# Patient Record
Sex: MALE | Race: BLACK OR AFRICAN AMERICAN | Employment: PART TIME | URBAN - METROPOLITAN AREA
[De-identification: names, ages, dates, MRNs, and addresses within clinical notes are randomized per-mention and may not be internally consistent; named-entity substitution may affect disease eponyms.]

---

## 2017-08-28 ENCOUNTER — HOSPITAL ENCOUNTER (EMERGENCY)
Age: 32
Discharge: HOME | End: 2017-08-28
Attending: EMERGENCY MEDICINE
Payer: SELF-PAY

## 2017-08-28 ENCOUNTER — APPOINTMENT (OUTPATIENT)
Dept: GENERAL RADIOLOGY | Age: 32
End: 2017-08-28
Attending: EMERGENCY MEDICINE
Payer: SELF-PAY

## 2017-08-28 VITALS
OXYGEN SATURATION: 100 % | HEIGHT: 68 IN | TEMPERATURE: 98.1 F | SYSTOLIC BLOOD PRESSURE: 124 MMHG | BODY MASS INDEX: 20.46 KG/M2 | DIASTOLIC BLOOD PRESSURE: 89 MMHG | RESPIRATION RATE: 14 BRPM | HEART RATE: 80 BPM | WEIGHT: 135 LBS

## 2017-08-28 DIAGNOSIS — M54.42 ACUTE BILATERAL LOW BACK PAIN WITH BILATERAL SCIATICA: ICD-10-CM

## 2017-08-28 DIAGNOSIS — M54.41 ACUTE BILATERAL LOW BACK PAIN WITH BILATERAL SCIATICA: ICD-10-CM
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
## 2017-08-28 PROCEDURE — 74011250636 HC RX REV CODE- 250/636: Performed by: EMERGENCY MEDICINE

## 2017-08-28 PROCEDURE — 74011250637 HC RX REV CODE- 250/637: Performed by: EMERGENCY MEDICINE

## 2017-08-28 PROCEDURE — 72100 X-RAY EXAM L-S SPINE 2/3 VWS: CPT

## 2017-08-28 PROCEDURE — 96372 THER/PROPH/DIAG INJ SC/IM: CPT

## 2017-08-28 PROCEDURE — 99283 EMERGENCY DEPT VISIT LOW MDM: CPT

## 2017-08-28 RX ORDER — HYDROCODONE BITARTRATE AND ACETAMINOPHEN 5; 325 MG/1; MG/1
1 TABLET ORAL
Status: COMPLETED | OUTPATIENT
Start: 2017-08-28 | End: 2017-08-28

## 2017-08-28 RX ORDER — HYDROCODONE BITARTRATE AND ACETAMINOPHEN 5; 325 MG/1; MG/1
1 TABLET ORAL
Qty: 10 TAB | Refills: 0 | Status: SHIPPED | OUTPATIENT
Start: 2017-08-28

## 2017-08-28 RX ORDER — KETOROLAC TROMETHAMINE 30 MG/ML
15 INJECTION, SOLUTION INTRAMUSCULAR; INTRAVENOUS
Status: DISCONTINUED | OUTPATIENT
Start: 2017-08-28 | End: 2017-08-28

## 2017-08-28 RX ORDER — IBUPROFEN 800 MG/1
800 TABLET ORAL DAILY
COMMUNITY
End: 2017-08-28

## 2017-08-28 RX ORDER — KETOROLAC TROMETHAMINE 30 MG/ML
30 INJECTION, SOLUTION INTRAMUSCULAR; INTRAVENOUS
Status: COMPLETED | OUTPATIENT
Start: 2017-08-28 | End: 2017-08-28

## 2017-08-28 RX ORDER — IBUPROFEN 600 MG/1
600 TABLET ORAL
Qty: 30 TAB | Refills: 0 | Status: SHIPPED | OUTPATIENT
Start: 2017-08-28

## 2017-08-28 RX ADMIN — HYDROCODONE BITARTRATE AND ACETAMINOPHEN 1 TABLET: 5; 325 TABLET ORAL at 08:45

## 2017-08-28 RX ADMIN — KETOROLAC TROMETHAMINE 30 MG: 30 INJECTION, SOLUTION INTRAMUSCULAR at 08:45

## 2017-08-28 NOTE — ED NOTES
Patient stated understanding of discharge instructions. Patient received twp prescription(s) Patient told not to drive with medication. Patient was ambulatory upon discharge. Patient was in stable condition. Patient was accompanies with family member.
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
## 2017-08-28 NOTE — DISCHARGE INSTRUCTIONS
Back Pain: Care Instructions  Your Care Instructions    Back pain has many possible causes. It is often related to problems with muscles and ligaments of the back. It may also be related to problems with the nerves, discs, or bones of the back. Moving, lifting, standing, sitting, or sleeping in an awkward way can strain the back. Sometimes you don't notice the injury until later. Arthritis is another common cause of back pain. Although it may hurt a lot, back pain usually improves on its own within several weeks. Most people recover in 12 weeks or less. Using good home treatment and being careful not to stress your back can help you feel better sooner. Follow-up care is a key part of your treatment and safety. Be sure to make and go to all appointments, and call your doctor if you are having problems. Its also a good idea to know your test results and keep a list of the medicines you take. How can you care for yourself at home? · Sit or lie in positions that are most comfortable and reduce your pain. Try one of these positions when you lie down:  ¨ Lie on your back with your knees bent and supported by large pillows. ¨ Lie on the floor with your legs on the seat of a sofa or chair. [place] on your side with your knees and hips bent and a pillow between your legs. ¨ Lie on your stomach if it does not make pain worse. · Do not sit up in bed, and avoid soft couches and twisted positions. Bed rest can help relieve pain at first, but it delays healing. Avoid bed rest after the first day of back pain. · Change positions every 30 minutes. If you must sit for long periods of time, take breaks from sitting. Get up and walk around, or lie in a comfortable position. · Try using a heating pad on a low or medium setting for 15 to 20 minutes every 2 or 3 hours. Try a warm shower in place of one session with the heating pad. · You can also try an ice pack for 10 to 15 minutes every 2 to 3 hours.  Put a thin cloth between the ice pack and your skin. · Take pain medicines exactly as directed. ¨ If the doctor gave you a prescription medicine for pain, take it as prescribed. ¨ If you are not taking a prescription pain medicine, ask your doctor if you can take an over-the-counter medicine. · Take short walks several times a day. You can start with 5 to 10 minutes, 3 or 4 times a day, and work up to longer walks. Walk on level surfaces and avoid hills and stairs until your back is better. · Return to work and other activities as soon as you can. Continued rest without activity is usually not good for your back. · To prevent future back pain, do exercises to stretch and strengthen your back and stomach. Learn how to use good posture, safe lifting techniques, and proper body mechanics. When should you call for help? Call your doctor now or seek immediate medical care if:  · You have new or worsening numbness in your legs. · You have new or worsening weakness in your legs. (This could make it hard to stand up.)  · You lose control of your bladder or bowels. Watch closely for changes in your health, and be sure to contact your doctor if:  · Your pain gets worse. · You are not getting better after 2 weeks. Where can you learn more? Go to http://ayaan-marquita.info/. Enter H955 in the search box to learn more about \"Back Pain: Care Instructions. \"  Current as of: March 21, 2017  Content Version: 11.3  © 5377-2796 [employer]. Care instructions adapted under license by [employer] (which disclaims liability or warranty for this information). If you have questions about a medical condition or this instruction, always ask your healthcare professional. [address] any warranty or liability for your use of this information.

## 2021-03-05 ENCOUNTER — HISTORICAL (OUTPATIENT)
Dept: ADMINISTRATIVE | Facility: HOSPITAL | Age: 36
End: 2021-03-05

## 2021-03-05 LAB
ALBUMIN SERPL BCP-MCNC: 3.9 G/DL (ref 3.5–5)
ALBUMIN/GLOB SERPL: 0.9 {RATIO}
ALP SERPL-CCNC: 69 U/L (ref 45–115)
ALT SERPL W P-5'-P-CCNC: 44 U/L (ref 16–61)
ANION GAP SERPL CALCULATED.3IONS-SCNC: 15 MMOL/L
AST SERPL W P-5'-P-CCNC: 33 U/L (ref 15–37)
BASOPHILS # BLD AUTO: 0.05 X10E3/UL (ref 0–0.2)
BASOPHILS NFR BLD AUTO: 0.6 % (ref 0–1)
BILIRUB SERPL-MCNC: 0.5 MG/DL (ref 0–1.2)
BUN SERPL-MCNC: 7 MG/DL (ref 7–18)
BUN/CREAT SERPL: 7.2
CALCIUM SERPL-MCNC: 8.9 MG/DL (ref 8.5–10.1)
CHLORIDE SERPL-SCNC: 106 MMOL/L (ref 98–107)
CO2 SERPL-SCNC: 26 MMOL/L (ref 21–32)
CREAT SERPL-MCNC: 0.97 MG/DL (ref 0.7–1.3)
EOSINOPHIL # BLD AUTO: 0.1 X10E3/UL (ref 0–0.5)
EOSINOPHIL NFR BLD AUTO: 1.2 % (ref 1–4)
ERYTHROCYTE [DISTWIDTH] IN BLOOD BY AUTOMATED COUNT: 13.3 % (ref 11.5–14.5)
GLOBULIN SER-MCNC: 4.2 G/DL (ref 2–4)
GLUCOSE SERPL-MCNC: 84 MG/DL (ref 74–106)
HCT VFR BLD AUTO: 46.8 % (ref 40–54)
HGB BLD-MCNC: 15.8 G/DL (ref 13.5–18)
IMM GRANULOCYTES # BLD AUTO: 0.03 X10E3/UL (ref 0–0.04)
IMM GRANULOCYTES NFR BLD: 0.4 % (ref 0–0.4)
LYMPHOCYTES # BLD AUTO: 2.87 X10E3/UL (ref 1–4.8)
LYMPHOCYTES NFR BLD AUTO: 35.7 % (ref 27–41)
MCH RBC QN AUTO: 32.6 PG (ref 27–31)
MCHC RBC AUTO-ENTMCNC: 33.8 G/DL (ref 32–36)
MCV RBC AUTO: 96.5 FL (ref 80–96)
MONOCYTES # BLD AUTO: 0.75 X10E3/UL (ref 0–0.8)
MONOCYTES NFR BLD AUTO: 9.3 % (ref 2–6)
MPC BLD CALC-MCNC: 8.7 FL (ref 9.4–12.4)
NEUTROPHILS # BLD AUTO: 4.23 X10E3/UL (ref 1.8–7.7)
NEUTROPHILS NFR BLD AUTO: 52.8 % (ref 53–65)
NRBC # BLD AUTO: 0 X10E3/UL (ref 0–0)
NRBC, AUTO (.00): 0 /100 (ref 0–0)
PLATELET # BLD AUTO: 355 X10E3/UL (ref 150–400)
POTASSIUM SERPL-SCNC: 4 MMOL/L (ref 3.5–5.1)
PROT SERPL-MCNC: 8.1 G/DL (ref 6.4–8.2)
RBC # BLD AUTO: 4.85 X10E6/UL (ref 4.6–6.2)
SODIUM SERPL-SCNC: 143 MMOL/L (ref 136–145)
WBC # BLD AUTO: 8.03 X10E3/UL (ref 4.5–11)

## 2021-05-30 ENCOUNTER — HOSPITAL ENCOUNTER (EMERGENCY)
Facility: HOSPITAL | Age: 36
Discharge: HOME | End: 2021-05-30

## 2021-05-30 VITALS
HEART RATE: 80 BPM | BODY MASS INDEX: 21.48 KG/M2 | TEMPERATURE: 98 F | SYSTOLIC BLOOD PRESSURE: 141 MMHG | OXYGEN SATURATION: 97 % | RESPIRATION RATE: 20 BRPM | DIASTOLIC BLOOD PRESSURE: 91 MMHG | WEIGHT: 145 LBS | HEIGHT: 69 IN

## 2021-05-30 DIAGNOSIS — K13.79 MOUTH PAIN: Primary | ICD-10-CM

## 2021-05-30 PROCEDURE — 99283 PR EMERGENCY DEPT VISIT,LEVEL III: ICD-10-PCS | Mod: ,,, | Performed by: NURSE PRACTITIONER

## 2021-05-30 PROCEDURE — 99283 EMERGENCY DEPT VISIT LOW MDM: CPT | Mod: ,,, | Performed by: NURSE PRACTITIONER

## 2021-05-30 PROCEDURE — 99283 EMERGENCY DEPT VISIT LOW MDM: CPT

## 2021-05-30 RX ORDER — AMOXICILLIN 875 MG/1
875 TABLET, FILM COATED ORAL 2 TIMES DAILY
Qty: 14 TABLET | Refills: 0 | Status: SHIPPED | OUTPATIENT
Start: 2021-05-30 | End: 2021-06-09

## 2021-05-30 RX ORDER — AMOXICILLIN 875 MG/1
875 TABLET, FILM COATED ORAL 2 TIMES DAILY
Qty: 14 TABLET | Refills: 0 | Status: SHIPPED | OUTPATIENT
Start: 2021-05-30 | End: 2021-05-30 | Stop reason: SDUPTHER

## 2021-09-23 ENCOUNTER — LAB VISIT (OUTPATIENT)
Dept: PRIMARY CARE CLINIC | Facility: CLINIC | Age: 36
End: 2021-09-23

## 2021-09-23 DIAGNOSIS — Z02.83 ENCOUNTER FOR EMPLOYMENT-RELATED DRUG TESTING: ICD-10-CM

## 2021-09-23 PROCEDURE — 99000 PR SPECIMEN HANDLING,DR OFF->LAB: ICD-10-PCS | Mod: ,,, | Performed by: NURSE PRACTITIONER

## 2021-09-23 PROCEDURE — 99000 SPECIMEN HANDLING OFFICE-LAB: CPT | Mod: ,,, | Performed by: NURSE PRACTITIONER

## 2023-04-15 ENCOUNTER — HOSPITAL ENCOUNTER (INPATIENT)
Facility: HOSPITAL | Age: 38
LOS: 2 days | Discharge: HOME | DRG: 247 | End: 2023-04-17
Attending: EMERGENCY MEDICINE | Admitting: INTERNAL MEDICINE

## 2023-04-15 DIAGNOSIS — I49.9 ARRHYTHMIA: ICD-10-CM

## 2023-04-15 DIAGNOSIS — I21.02 ST ELEVATION MYOCARDIAL INFARCTION INVOLVING LEFT ANTERIOR DESCENDING (LAD) CORONARY ARTERY: Primary | ICD-10-CM

## 2023-04-15 DIAGNOSIS — I47.20 VENTRICULAR TACHYCARDIA: ICD-10-CM

## 2023-04-15 DIAGNOSIS — F17.200 SMOKER: ICD-10-CM

## 2023-04-15 DIAGNOSIS — F10.10 ETOH ABUSE: ICD-10-CM

## 2023-04-15 DIAGNOSIS — R07.9 CHEST PAIN: ICD-10-CM

## 2023-04-15 DIAGNOSIS — I21.3 ST ELEVATION MYOCARDIAL INFARCTION (STEMI), UNSPECIFIED ARTERY: ICD-10-CM

## 2023-04-15 DIAGNOSIS — F19.10 SUBSTANCE ABUSE: ICD-10-CM

## 2023-04-15 DIAGNOSIS — I21.3 STEMI (ST ELEVATION MYOCARDIAL INFARCTION): ICD-10-CM

## 2023-04-15 LAB
ALBUMIN SERPL BCP-MCNC: 2.9 G/DL (ref 3.5–5)
ALBUMIN/GLOB SERPL: 0.9 {RATIO}
ALP SERPL-CCNC: 55 U/L (ref 45–115)
ALT SERPL W P-5'-P-CCNC: 47 U/L (ref 16–61)
AMPHET UR QL SCN: NEGATIVE
ANION GAP SERPL CALCULATED.3IONS-SCNC: 5 MMOL/L (ref 7–16)
AST SERPL W P-5'-P-CCNC: 42 U/L (ref 15–37)
BARBITURATES UR QL SCN: NEGATIVE
BASOPHILS # BLD AUTO: 0.11 K/UL (ref 0–0.2)
BASOPHILS NFR BLD AUTO: 0.6 % (ref 0–1)
BASOPHILS NFR BLD MANUAL: 1 % (ref 0–1)
BENZODIAZ METAB UR QL SCN: POSITIVE
BILIRUB SERPL-MCNC: 0.5 MG/DL (ref ?–1.2)
BUN SERPL-MCNC: 7 MG/DL (ref 7–18)
BUN/CREAT SERPL: 7 (ref 6–20)
CALCIUM SERPL-MCNC: 8.4 MG/DL (ref 8.5–10.1)
CANNABINOIDS UR QL SCN: POSITIVE
CATH EF QUANTITATIVE: 45 %
CHLORIDE SERPL-SCNC: 107 MMOL/L (ref 98–107)
CO2 SERPL-SCNC: 30 MMOL/L (ref 21–32)
COCAINE UR QL SCN: POSITIVE
CREAT SERPL-MCNC: 0.94 MG/DL (ref 0.7–1.3)
DIFFERENTIAL METHOD BLD: ABNORMAL
EGFR (NO RACE VARIABLE) (RUSH/TITUS): 108 ML/MIN/1.73M²
EOSINOPHIL # BLD AUTO: 0.14 K/UL (ref 0–0.5)
EOSINOPHIL NFR BLD AUTO: 0.8 % (ref 1–4)
EOSINOPHIL NFR BLD MANUAL: 1 % (ref 1–4)
ERYTHROCYTE [DISTWIDTH] IN BLOOD BY AUTOMATED COUNT: 14.5 % (ref 11.5–14.5)
GLOBULIN SER-MCNC: 3.1 G/DL (ref 2–4)
GLUCOSE SERPL-MCNC: 129 MG/DL (ref 74–106)
GLUCOSE SERPL-MCNC: 145 MG/DL (ref 70–105)
GLUCOSE SERPL-MCNC: 94 MG/DL (ref 70–105)
HCT VFR BLD AUTO: 48.3 % (ref 40–54)
HGB BLD-MCNC: 15.6 G/DL (ref 13.5–18)
IMM GRANULOCYTES # BLD AUTO: 0.09 K/UL (ref 0–0.04)
IMM GRANULOCYTES NFR BLD: 0.5 % (ref 0–0.4)
INR BLD: 1.01
LYMPHOCYTES # BLD AUTO: 4.36 K/UL (ref 1–4.8)
LYMPHOCYTES NFR BLD AUTO: 25.7 % (ref 27–41)
LYMPHOCYTES NFR BLD MANUAL: 25 % (ref 27–41)
MCH RBC QN AUTO: 29.3 PG (ref 27–31)
MCHC RBC AUTO-ENTMCNC: 32.3 G/DL (ref 32–36)
MCV RBC AUTO: 90.8 FL (ref 80–96)
MONOCYTES # BLD AUTO: 1.21 K/UL (ref 0–0.8)
MONOCYTES NFR BLD AUTO: 7.1 % (ref 2–6)
MONOCYTES NFR BLD MANUAL: 5 % (ref 2–6)
MPC BLD CALC-MCNC: 8.7 FL (ref 9.4–12.4)
NEUTROPHILS # BLD AUTO: 11.07 K/UL (ref 1.8–7.7)
NEUTROPHILS NFR BLD AUTO: 65.3 % (ref 53–65)
NEUTS BAND NFR BLD MANUAL: 2 % (ref 1–5)
NEUTS SEG NFR BLD MANUAL: 66 % (ref 50–62)
NRBC # BLD AUTO: 0 X10E3/UL
NRBC, AUTO (.00): 0 %
OPIATES UR QL SCN: POSITIVE
PCP UR QL SCN: NEGATIVE
PLATELET # BLD AUTO: 384 K/UL (ref 150–400)
PLATELET MORPHOLOGY: NORMAL
POTASSIUM SERPL-SCNC: 4.3 MMOL/L (ref 3.5–5.1)
PROT SERPL-MCNC: 6 G/DL (ref 6.4–8.2)
PROTHROMBIN TIME: 12.9 SECONDS (ref 11.7–14.7)
RBC # BLD AUTO: 5.32 M/UL (ref 4.6–6.2)
RBC MORPH BLD: NORMAL
SODIUM SERPL-SCNC: 138 MMOL/L (ref 136–145)
TROPONIN I SERPL HS-MCNC: 30.2 PG/ML
WBC # BLD AUTO: 16.98 K/UL (ref 4.5–11)

## 2023-04-15 PROCEDURE — 99285 EMERGENCY DEPT VISIT HI MDM: CPT | Mod: ,,, | Performed by: EMERGENCY MEDICINE

## 2023-04-15 PROCEDURE — 85610 PROTHROMBIN TIME: CPT | Performed by: EMERGENCY MEDICINE

## 2023-04-15 PROCEDURE — 93458 PR CATH PLACE/CORON ANGIO, IMG SUPER/INTERP,W LEFT HEART VENTRICULOGRAPHY: ICD-10-PCS | Mod: 26,59,51, | Performed by: INTERNAL MEDICINE

## 2023-04-15 PROCEDURE — 27201423 OPTIME MED/SURG SUP & DEVICES STERILE SUPPLY: Performed by: INTERNAL MEDICINE

## 2023-04-15 PROCEDURE — 63600150 PHARM REV CODE 636: Performed by: INTERNAL MEDICINE

## 2023-04-15 PROCEDURE — 96375 TX/PRO/DX INJ NEW DRUG ADDON: CPT

## 2023-04-15 PROCEDURE — 25000003 PHARM REV CODE 250: Performed by: INTERNAL MEDICINE

## 2023-04-15 PROCEDURE — C1894 INTRO/SHEATH, NON-LASER: HCPCS | Performed by: INTERNAL MEDICINE

## 2023-04-15 PROCEDURE — 99152 PR MOD CONSCIOUS SEDATION, SAME PHYS, 5+ YRS, FIRST 15 MIN: ICD-10-PCS | Mod: ,,, | Performed by: INTERNAL MEDICINE

## 2023-04-15 PROCEDURE — 92941 PRQ TRLML REVSC TOT OCCL AMI: CPT | Mod: LD,,, | Performed by: INTERNAL MEDICINE

## 2023-04-15 PROCEDURE — C1760 CLOSURE DEV, VASC: HCPCS | Performed by: INTERNAL MEDICINE

## 2023-04-15 PROCEDURE — 99285 PR EMERGENCY DEPT VISIT,LEVEL V: ICD-10-PCS | Mod: ,,, | Performed by: EMERGENCY MEDICINE

## 2023-04-15 PROCEDURE — 82962 GLUCOSE BLOOD TEST: CPT

## 2023-04-15 PROCEDURE — 20000000 HC ICU ROOM

## 2023-04-15 PROCEDURE — 93010 ELECTROCARDIOGRAM REPORT: CPT | Mod: 59,,, | Performed by: INTERNAL MEDICINE

## 2023-04-15 PROCEDURE — C9606 PERC D-E COR REVASC W AMI S: HCPCS | Mod: LD | Performed by: INTERNAL MEDICINE

## 2023-04-15 PROCEDURE — 96374 THER/PROPH/DIAG INJ IV PUSH: CPT

## 2023-04-15 PROCEDURE — 25000003 PHARM REV CODE 250: Performed by: EMERGENCY MEDICINE

## 2023-04-15 PROCEDURE — 99285 EMERGENCY DEPT VISIT HI MDM: CPT | Mod: 25

## 2023-04-15 PROCEDURE — C1874 STENT, COATED/COV W/DEL SYS: HCPCS | Performed by: INTERNAL MEDICINE

## 2023-04-15 PROCEDURE — 93458 L HRT ARTERY/VENTRICLE ANGIO: CPT | Performed by: INTERNAL MEDICINE

## 2023-04-15 PROCEDURE — 25000003 PHARM REV CODE 250: Performed by: NURSE PRACTITIONER

## 2023-04-15 PROCEDURE — 84484 ASSAY OF TROPONIN QUANT: CPT | Performed by: EMERGENCY MEDICINE

## 2023-04-15 PROCEDURE — 63600175 PHARM REV CODE 636 W HCPCS: Performed by: INTERNAL MEDICINE

## 2023-04-15 PROCEDURE — 99152 MOD SED SAME PHYS/QHP 5/>YRS: CPT | Mod: ,,, | Performed by: INTERNAL MEDICINE

## 2023-04-15 PROCEDURE — 93005 ELECTROCARDIOGRAM TRACING: CPT

## 2023-04-15 PROCEDURE — C1769 GUIDE WIRE: HCPCS | Performed by: INTERNAL MEDICINE

## 2023-04-15 PROCEDURE — 93010 EKG 12-LEAD: ICD-10-PCS | Mod: 59,,, | Performed by: INTERNAL MEDICINE

## 2023-04-15 PROCEDURE — 85025 COMPLETE CBC W/AUTO DIFF WBC: CPT | Performed by: EMERGENCY MEDICINE

## 2023-04-15 PROCEDURE — 99152 MOD SED SAME PHYS/QHP 5/>YRS: CPT | Performed by: INTERNAL MEDICINE

## 2023-04-15 PROCEDURE — 80053 COMPREHEN METABOLIC PANEL: CPT | Performed by: EMERGENCY MEDICINE

## 2023-04-15 PROCEDURE — C1887 CATHETER, GUIDING: HCPCS | Performed by: INTERNAL MEDICINE

## 2023-04-15 PROCEDURE — 93458 L HRT ARTERY/VENTRICLE ANGIO: CPT | Mod: 26,59,51, | Performed by: INTERNAL MEDICINE

## 2023-04-15 PROCEDURE — 80307 DRUG TEST PRSMV CHEM ANLYZR: CPT | Performed by: EMERGENCY MEDICINE

## 2023-04-15 PROCEDURE — 25500020 PHARM REV CODE 255: Performed by: INTERNAL MEDICINE

## 2023-04-15 PROCEDURE — 92941 PR AMI ANY METHOD: ICD-10-PCS | Mod: LD,,, | Performed by: INTERNAL MEDICINE

## 2023-04-15 PROCEDURE — 63600175 PHARM REV CODE 636 W HCPCS: Performed by: EMERGENCY MEDICINE

## 2023-04-15 DEVICE — ANGIO-SEAL VIP VASCULAR CLOSURE DEVICE
Type: IMPLANTABLE DEVICE | Site: CORONARY | Status: FUNCTIONAL
Brand: ANGIO-SEAL

## 2023-04-15 DEVICE — EVEROLIMUS-ELUTING PLATINUM CHROMIUM CORONARY STENT SYSTEM
Type: IMPLANTABLE DEVICE | Site: CORONARY | Status: FUNCTIONAL
Brand: SYNERGY™ XD

## 2023-04-15 RX ORDER — SERTRALINE HCL 100 MG
100 TABLET ORAL
COMMUNITY
Start: 2022-12-15 | End: 2024-03-12 | Stop reason: CLARIF

## 2023-04-15 RX ORDER — ACETAMINOPHEN 325 MG/1
650 TABLET ORAL EVERY 4 HOURS PRN
Status: DISCONTINUED | OUTPATIENT
Start: 2023-04-15 | End: 2023-04-17 | Stop reason: HOSPADM

## 2023-04-15 RX ORDER — HEPARIN SODIUM 1000 [USP'U]/ML
INJECTION, SOLUTION INTRAVENOUS; SUBCUTANEOUS
Status: DISCONTINUED | OUTPATIENT
Start: 2023-04-15 | End: 2023-04-15 | Stop reason: HOSPADM

## 2023-04-15 RX ORDER — FENTANYL CITRATE 50 UG/ML
INJECTION, SOLUTION INTRAMUSCULAR; INTRAVENOUS
Status: DISCONTINUED | OUTPATIENT
Start: 2023-04-15 | End: 2023-04-15 | Stop reason: HOSPADM

## 2023-04-15 RX ORDER — ARIPIPRAZOLE 5 MG/1
5 TABLET ORAL
COMMUNITY
Start: 2022-12-15 | End: 2024-03-12 | Stop reason: CLARIF

## 2023-04-15 RX ORDER — MORPHINE SULFATE 2 MG/ML
2 INJECTION, SOLUTION INTRAMUSCULAR; INTRAVENOUS EVERY 4 HOURS PRN
Status: DISCONTINUED | OUTPATIENT
Start: 2023-04-15 | End: 2023-04-17 | Stop reason: HOSPADM

## 2023-04-15 RX ORDER — LISINOPRIL 5 MG/1
5 TABLET ORAL DAILY
Qty: 90 TABLET | Refills: 3 | Status: SHIPPED | OUTPATIENT
Start: 2023-04-15 | End: 2023-04-17 | Stop reason: HOSPADM

## 2023-04-15 RX ORDER — NAPROXEN SODIUM 220 MG/1
81 TABLET, FILM COATED ORAL DAILY
Status: DISCONTINUED | OUTPATIENT
Start: 2023-04-16 | End: 2023-04-17 | Stop reason: HOSPADM

## 2023-04-15 RX ORDER — LIDOCAINE HYDROCHLORIDE 10 MG/ML
INJECTION INFILTRATION; PERINEURAL
Status: DISCONTINUED | OUTPATIENT
Start: 2023-04-15 | End: 2023-04-16

## 2023-04-15 RX ORDER — NAPROXEN SODIUM 220 MG/1
TABLET, FILM COATED ORAL
Status: COMPLETED
Start: 2023-04-15 | End: 2023-04-15

## 2023-04-15 RX ORDER — SODIUM CHLORIDE 450 MG/100ML
125 INJECTION, SOLUTION INTRAVENOUS CONTINUOUS
Status: ACTIVE | OUTPATIENT
Start: 2023-04-15 | End: 2023-04-15

## 2023-04-15 RX ORDER — ROSUVASTATIN CALCIUM 40 MG/1
40 TABLET, COATED ORAL NIGHTLY
Qty: 90 TABLET | Refills: 3 | Status: SHIPPED | OUTPATIENT
Start: 2023-04-15 | End: 2023-04-17 | Stop reason: HOSPADM

## 2023-04-15 RX ORDER — MORPHINE SULFATE 4 MG/ML
4 INJECTION, SOLUTION INTRAMUSCULAR; INTRAVENOUS
Status: COMPLETED | OUTPATIENT
Start: 2023-04-15 | End: 2023-04-15

## 2023-04-15 RX ORDER — MUPIROCIN 20 MG/G
OINTMENT TOPICAL 2 TIMES DAILY
Status: DISCONTINUED | OUTPATIENT
Start: 2023-04-15 | End: 2023-04-17 | Stop reason: HOSPADM

## 2023-04-15 RX ORDER — MIDAZOLAM HYDROCHLORIDE 1 MG/ML
INJECTION INTRAMUSCULAR; INTRAVENOUS
Status: DISCONTINUED | OUTPATIENT
Start: 2023-04-15 | End: 2023-04-15 | Stop reason: HOSPADM

## 2023-04-15 RX ORDER — ONDANSETRON 4 MG/1
8 TABLET, ORALLY DISINTEGRATING ORAL EVERY 8 HOURS PRN
Status: DISCONTINUED | OUTPATIENT
Start: 2023-04-15 | End: 2023-04-17 | Stop reason: HOSPADM

## 2023-04-15 RX ORDER — ONDANSETRON 2 MG/ML
4 INJECTION INTRAMUSCULAR; INTRAVENOUS
Status: COMPLETED | OUTPATIENT
Start: 2023-04-15 | End: 2023-04-15

## 2023-04-15 RX ORDER — ATORVASTATIN CALCIUM 40 MG/1
40 TABLET, FILM COATED ORAL NIGHTLY
Status: DISCONTINUED | OUTPATIENT
Start: 2023-04-15 | End: 2023-04-17 | Stop reason: HOSPADM

## 2023-04-15 RX ORDER — HEPARIN SODIUM 5000 [USP'U]/ML
INJECTION, SOLUTION INTRAVENOUS; SUBCUTANEOUS
Status: COMPLETED
Start: 2023-04-15 | End: 2023-04-15

## 2023-04-15 RX ORDER — ASPIRIN 325 MG
TABLET ORAL CODE/TRAUMA/SEDATION MEDICATION
Status: DISCONTINUED | OUTPATIENT
Start: 2023-04-15 | End: 2023-04-16

## 2023-04-15 RX ORDER — DOPAMINE HYDROCHLORIDE 320 MG/100ML
INJECTION, SOLUTION INTRAVENOUS
Status: DISCONTINUED | OUTPATIENT
Start: 2023-04-15 | End: 2023-04-16

## 2023-04-15 RX ORDER — CARVEDILOL 3.12 MG/1
3.12 TABLET ORAL 2 TIMES DAILY
Status: DISCONTINUED | OUTPATIENT
Start: 2023-04-15 | End: 2023-04-17

## 2023-04-15 RX ADMIN — MORPHINE SULFATE 4 MG: 4 INJECTION INTRAVENOUS at 11:04

## 2023-04-15 RX ADMIN — TICAGRELOR 90 MG: 90 TABLET ORAL at 10:04

## 2023-04-15 RX ADMIN — ASPIRIN 325 MG ORAL TABLET 324 MG: 325 PILL ORAL at 10:04

## 2023-04-15 RX ADMIN — CARVEDILOL 3.12 MG: 3.12 TABLET, FILM COATED ORAL at 09:04

## 2023-04-15 RX ADMIN — ONDANSETRON HYDROCHLORIDE 4 MG: 2 SOLUTION INTRAMUSCULAR; INTRAVENOUS at 11:04

## 2023-04-15 RX ADMIN — MORPHINE SULFATE 2 MG: 2 INJECTION, SOLUTION INTRAMUSCULAR; INTRAVENOUS at 09:04

## 2023-04-15 RX ADMIN — MUPIROCIN: 20 OINTMENT TOPICAL at 09:04

## 2023-04-15 RX ADMIN — ATORVASTATIN CALCIUM 40 MG: 40 TABLET, FILM COATED ORAL at 09:04

## 2023-04-15 RX ADMIN — ACETAMINOPHEN 650 MG: 325 TABLET ORAL at 05:04

## 2023-04-15 RX ADMIN — SODIUM CHLORIDE 125 ML/HR: 4.5 INJECTION, SOLUTION INTRAVENOUS at 03:04

## 2023-04-15 NOTE — LETTER
April 17, 2023         [address] [place] [id number]  Phone: [phone]  Fax: [phone]       Patient: Compa Paredes   YOB: 1985  Date of Visit: 04/17/2023    To Whom It May Concern:    Michelle Paredes  was at [facility] from 04/15/2023 to 04/17/2023. The patient may return to work/school on April, 24.202. With no restrictions. If you have any questions or concerns, or if I can be of further assistance, please do not hesitate to contact me.    Sincerely,    Anh Espana LPN

## 2023-04-15 NOTE — ED PROVIDER NOTES
Encounter Date: 4/15/2023    SCRIBE #1 NOTE: I, Arlene Rivas, am scribing for, and in the presence of,  John Pelayo MD. I have scribed the entire note.     History     Chief Complaint   Patient presents with    Chest Pain     The patient is a 37 y.o. male who presents to the emergency department for chest pain that began today at 9:30. The patient states that after giving blood this morning, he went to work an began having a headache and chest pain. He also appears diaphoretic. The patient denies diabetes and hypertension and has no history of heart attack. The patient smokes cigarettes and claims that the last time he used cocaine was a month ago. There are no other complaints in the ED at this time.    The history is provided by the patient. No  was used.   Review of patient's allergies indicates:  No Known Allergies  History reviewed. No pertinent past medical history.  History reviewed. No pertinent surgical history.  History reviewed. No pertinent family history.  Social History     Tobacco Use    Smoking status: Every Day     Packs/day: 1.00     Types: Cigarettes    Smokeless tobacco: Never   Substance Use Topics    Alcohol use: Yes    Drug use: Not Currently     Types: Cocaine     Review of Systems   Constitutional:  Positive for diaphoresis.   Eyes: Negative.    Cardiovascular:  Positive for chest pain.   Gastrointestinal:  Negative for abdominal pain.   Endocrine: Negative.    Allergic/Immunologic: Negative.    Neurological:  Positive for headaches.   All other systems reviewed and are negative.    Physical Exam     Initial Vitals   BP Pulse Resp Temp SpO2   04/15/23 1053 04/15/23 1053 04/15/23 1053 04/15/23 1058 04/15/23 1053   (!) 149/112 94 (!) 22 97.1 °F (36.2 °C) 100 %      MAP       --                Physical Exam    Nursing note and vitals reviewed.  Constitutional: He appears well-developed and well-nourished. He is diaphoretic.   HENT:   Head: Normocephalic and  atraumatic.   Eyes: Conjunctivae and EOM are normal. Pupils are equal, round, and reactive to light.   Cardiovascular:  Normal rate, regular rhythm and normal heart sounds.           Pulmonary/Chest: Breath sounds normal.   Abdominal: Abdomen is soft. Bowel sounds are normal.     Neurological: He is alert and oriented to person, place, and time.   Skin: Skin is warm.   Psychiatric: He has a normal mood and affect. His behavior is normal. Judgment and thought content normal.       ED Course   Procedures  Labs Reviewed   DRUG SCREEN, URINE (BEAKER)   EXTRA TUBES    Narrative:     The following orders were created for panel order EXTRA TUBES.  Procedure                               Abnormality         Status                     ---------                               -----------         ------                     Gold Top Hold[[id number]]                                    In process                 Pink Top Hold[[id number]]                                    In process                   Please view results for these tests on the individual orders.   GOLD TOP HOLD   PINK TOP HOLD     EKG Readings: (Independently Interpreted)   Rhythm: Normal Sinus Rhythm (consider acute STEMI). Heart Rate: 92.   Interpreted by Dr. Pelayo at 10:46.     Imaging Results              X-Ray Chest AP Portable (Final result)  Result time 04/15/23 12:09:19      Final result by John Domínguez MD (04/15/23 12:09:19)                   Impression:      No acute cardiopulmonary process.    Place of service: [facility]      Electronically signed by: John Domínguez  Date:    04/15/2023  Time:    12:09               Narrative:    EXAMINATION:  XR CHEST AP PORTABLE    CLINICAL HISTORY:  Chest pain, unspecified    COMPARISON:  03/14/2019    FINDINGS:  The cardiomediastinal silhouette is within normal limits. The lungs are clear. There is no pneumothorax or pleural effusion.    There is no acute osseous or soft tissue abnormality.                                        Medications   heparin (PORCINE) bolus from bag (3,672 Units Intravenous Given 4/15/23 1058)   ticagrelor tablet ( Oral Canceled Entry 4/15/23 1100)   aspirin tablet (324 mg Oral Given 4/15/23 1059)   DOPamine 800 mg in dextrose 5 % 250 mL infusion (premix) ( Intravenous Stopped 4/15/23 1159)   0.45% NaCl infusion (has no administration in time range)   acetaminophen tablet 650 mg (has no administration in time range)   ondansetron disintegrating tablet 8 mg (has no administration in time range)   ticagrelor tablet 90 mg (has no administration in time range)   ticagrelor tablet 180 mg (has no administration in time range)   carvediloL tablet 3.125 mg (has no administration in time range)   morphine injection 4 mg (4 mg Intravenous Given 4/15/23 1103)   ondansetron injection 4 mg (4 mg Intravenous Given 4/15/23 1103)   heparin (porcine) 5,000 unit/mL injection (  Override Pull 4/15/23 1100)   aspirin 81 MG chewable tablet (  Override Pull 4/15/23 1100)     Medical Decision Making:   ED Management:  [facility]    Patient presents for emergent evaluation of acute chest pain diaphoresis that poses a threat to life and/or bodily function.    In the ED patient found to have acute ST-elevation MI.    I ordered labs and personally reviewed them.  Labs significant for troponin normal.  Order through STEMI narrator.  CBC CMP unremarkable.    I ordered X-rays and personally reviewed them and reviewed the radiologist interpretation.  Xray significant for no acute abnormality.    I ordered EKG and personally reviewed it.  EKG significant for ST-elevation.      Admission [facility]  I discussed the patient presentation labs, ekg, X-rays findings with the consultant for cardiologist (speciality).    Patient was managed in the ED with IV heparin p.o. Brilinta p.o. aspirin sodium chloride morphine Zofran.    The response to treatment was improved.    Patient required emergent consultation to cardiologist  (admitting physician) for emergent heart catheterization.           Attending Attestation:           Physician Attestation for Scribe:  Physician Attestation Statement for Scribe #1: I, John Pelayo MD, reviewed documentation, as scribed by Arlene Rivas in my presence, and it is both accurate and complete.           ED Course as of 04/15/23 1547   Sat Apr 15, 2023   1049 Contacted Dr. Zamora [PK]   1049 Discussed with Dr. Zamora [PK]   1051 Cath lab activated 1052 [PK]      ED Course User Index  [PK] John Pelayo MD                 Clinical Impression:   Final diagnoses:  [I21.3] ST elevation myocardial infarction (STEMI), unspecified artery (Primary)  [R07.9] Chest pain  [I49.9] Arrhythmia        ED Disposition Condition    Admit Serious                John Pelayo MD  04/15/23 1547       John Pelayo MD  04/15/23 1547

## 2023-04-15 NOTE — HPI
Mr Paredes is a 38 yo AAM who is seen s/p [facility] with stent. He presented to the ED with approximately 90 mins of chest pain, mid sternal, non-radiating, associated with shortness of breath, nausea and diaphoresis, 10/10 at most severe, radiating into left bicep. He denies any previous medical history or cardiac history. He does use cocaine- last time was several weeks ago, does admit to using marijuana, and drinking 2 pints of liquor daily. He does not take any medications daily. He is a cook at [employer]. He has been educated at length on lifestyle modifications and medication compliance post cath and stent.

## 2023-04-15 NOTE — ASSESSMENT & PLAN NOTE
- s/p LHC with stent to proximal D1  - echo pending   - lipid and A1c pending   - started on asa, statin, BB, and brilinta

## 2023-04-15 NOTE — ED NOTES
Olivia with cath lab called for an update on pt, states as soon as team arrives they will call us back to come up.

## 2023-04-15 NOTE — CONSULTS
[facility]  Pulmonology  Consult Note    Patient Name: Compa Paredes  MRN: [id number]  Admission Date: 4/15/2023  Hospital Length of Stay: 0 days  Code Status: No Order  Attending Physician: No att. providers found  Primary Care Provider: Primary Doctor No   Principal Problem: STEMI (ST elevation myocardial infarction)    Consults  Subjective:     HPI:  Mr Paredes is a 36 yo AAM who is seen s/p C with stent. He presented to the ED with approximately 90 mins of chest pain, mid sternal, non-radiating, associated with shortness of breath, nausea and diaphoresis, 10/10 at most severe, radiating into left bicep. He denies any previous medical history or cardiac history. He does use cocaine- last time was several weeks ago, does admit to using marijuana, and drinking 2 pints of liquor daily. He does not take any medications daily. 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         History reviewed. No pertinent past medical history.    History reviewed. No pertinent surgical history.    Review of patient's allergies indicates:  No Known Allergies    Family History    None       Tobacco Use    Smoking status: Every Day     Packs/day: 1.00     Types: Cigarettes    Smokeless tobacco: Never   Substance and Sexual Activity    Alcohol use: Yes    Drug use: Not Currently     Types: Cocaine    Sexual activity: Yes         Review of Systems   Constitutional:  Negative for activity change, chills, fatigue and fever.   HENT:  Negative for congestion, ear pain, nosebleeds and sore throat.    Eyes:  Negative for photophobia, pain and visual disturbance.   Respiratory:  Negative for cough, shortness of breath and wheezing.    Cardiovascular:  Negative for chest pain and leg swelling.   Gastrointestinal:  Negative for abdominal pain, constipation, diarrhea, nausea and vomiting.   Endocrine: Negative for cold  intolerance and polydipsia.   Genitourinary:  Negative for dysuria, flank pain and frequency.   Musculoskeletal:  Negative for arthralgias.   Skin:  Negative for pallor and rash.   Allergic/Immunologic: Negative for environmental allergies and food allergies.   Neurological:  Negative for dizziness, syncope, weakness and numbness.   Psychiatric/Behavioral:  Negative for agitation and confusion. The patient is not nervous/anxious.    Objective:     Vital Signs (Most Recent):  Temp: 97.1 °F (36.2 °C) (04/15/23 1058)  Pulse: 78 (04/15/23 1122)  Resp: 12 (04/15/23 1122)  BP: 133/89 (04/15/23 1122)  SpO2: 100 % (04/15/23 1122) Vital Signs (24h Range):  Temp:  [97.1 °F (36.2 °C)] 97.1 °F (36.2 °C)  Pulse:  [78-94] 78  Resp:  [12-22] 12  SpO2:  [100 %] 100 %  BP: (133-149)/() 133/89     Weight: 61.2 kg (135 lb)  Body mass index is 20.53 kg/m².    No intake or output data in the 24 hours ending 04/15/23 1554    Physical Exam  Vitals and nursing note reviewed.   Constitutional:       General: He is awake.      Appearance: Normal appearance.   HENT:      Head: Normocephalic.      Mouth/Throat:      Mouth: Mucous membranes are moist.   Eyes:      General: Lids are normal. Scleral icterus present.      Extraocular Movements: Extraocular movements intact.      Pupils: Pupils are equal, round, and reactive to light.   Cardiovascular:      Rate and Rhythm: Normal rate and regular rhythm.      Pulses: Normal pulses.      Heart sounds: Normal heart sounds.   Pulmonary:      Effort: Pulmonary effort is normal.      Breath sounds: Normal breath sounds.   Abdominal:      General: Bowel sounds are normal.      Palpations: Abdomen is soft.      Tenderness: There is no abdominal tenderness.   Musculoskeletal:         General: Normal range of motion.      Cervical back: Neck supple.      Right lower leg: No edema.      Left lower leg: No edema.   Skin:     General: Skin is warm and dry.      Capillary Refill: Capillary refill takes  less than 2 seconds.   Neurological:      Mental Status: He is alert and oriented to person, place, and time. Mental status is at baseline.      GCS: GCS eye subscore is 4. GCS verbal subscore is 5. GCS motor subscore is 6.      Sensory: Sensation is intact.      Motor: Motor function is intact.   Psychiatric:         Mood and Affect: Mood normal.         Speech: Speech normal.         Behavior: Behavior normal. Behavior is cooperative.       Vents:  Oxygen Concentration (%): 100 (04/15/23 1128)    Lines/Drains/Airways       Peripheral Intravenous Line  Duration                  Peripheral IV - Single Lumen 04/15/23 1053 20 G Left;Posterior Wrist <1 day         Peripheral IV - Single Lumen 04/15/23 1054 18 G Right Antecubital <1 day                    Significant Labs:    CBC/Anemia Profile:  Recent Labs   Lab 04/15/23  1104   WBC 16.98*   HGB 15.6   HCT 48.3      MCV 90.8   RDW 14.5        Chemistries:  Recent Labs   Lab 04/15/23  1104      K 4.3      CO2 30   BUN 7   CREATININE 0.94   CALCIUM 8.4*   ALBUMIN 2.9*   PROT 6.0*   BILITOT 0.5   ALKPHOS 55   ALT 47   AST 42*       All pertinent labs within the past 24 hours have been reviewed.    Significant Imaging:   I have reviewed all pertinent imaging results/findings within the past 24 hours.    Assessment/Plan:     Psychiatric  ETOH abuse  - drinks 2 pints daily   - monitor for withdrawals       Substance abuse  - admits to cocaine use  - UDS pending   - will add prn ativan for possible withdrawals     Cardiac/Vascular  * STEMI (ST elevation myocardial infarction)  - s/p LHC with stent to proximal D1  - echo pending   - lipid and A1c pending   - started on asa, statin, BB, and brilinta       Other  Smoker  - cessation discussed              MICHELLE Fitch-AGACNP  Pulmonology  [facility]

## 2023-04-15 NOTE — NURSING
4/15/23      1618  Called cath lab and talked to Pelon to confirm the amount of brilinta given to patient. Pelon stated that he gave 90 mg of brilinta, and that the ER gave 90 mg.      1622  Called the ER and spoke to Pa Roblero RN to confirm the amount of brilinta given. Pa stated that he gave 90 mg of brilinta.     Spoke with Dr. Zamora. Pt will resume with 90 mg of brilinta for 4/16 AM

## 2023-04-15 NOTE — Clinical Note
The catheter was inserted over the wire into the ostium   left main. Hemodynamics were performed.  An angiography was performed of the left coronary arteries. Multiple views were taken.

## 2023-04-15 NOTE — SUBJECTIVE & OBJECTIVE
History reviewed. No pertinent past medical history.    History reviewed. No pertinent surgical history.    Review of patient's allergies indicates:  No Known Allergies    Family History    None       Tobacco Use    Smoking status: Every Day     Packs/day: 1.00     Types: Cigarettes    Smokeless tobacco: Never   Substance and Sexual Activity    Alcohol use: Yes    Drug use: Not Currently     Types: Cocaine    Sexual activity: Yes         Review of Systems   Constitutional:  Negative for activity change, chills, fatigue and fever.   HENT:  Negative for congestion, ear pain, nosebleeds and sore throat.    Eyes:  Negative for photophobia, pain and visual disturbance.   Respiratory:  Negative for cough, shortness of breath and wheezing.    Cardiovascular:  Negative for chest pain and leg swelling.   Gastrointestinal:  Negative for abdominal pain, constipation, diarrhea, nausea and vomiting.   Endocrine: Negative for cold intolerance and polydipsia.   Genitourinary:  Negative for dysuria, flank pain and frequency.   Musculoskeletal:  Negative for arthralgias.   Skin:  Negative for pallor and rash.   Allergic/Immunologic: Negative for environmental allergies and food allergies.   Neurological:  Negative for dizziness, syncope, weakness and numbness.   Psychiatric/Behavioral:  Negative for agitation and confusion. The patient is not nervous/anxious.    Objective:     Vital Signs (Most Recent):  Temp: 97.1 °F (36.2 °C) (04/15/23 1058)  Pulse: 78 (04/15/23 1122)  Resp: 12 (04/15/23 1122)  BP: 133/89 (04/15/23 1122)  SpO2: 100 % (04/15/23 1122) Vital Signs (24h Range):  Temp:  [97.1 °F (36.2 °C)] 97.1 °F (36.2 °C)  Pulse:  [78-94] 78  Resp:  [12-22] 12  SpO2:  [100 %] 100 %  BP: (133-149)/() 133/89     Weight: 61.2 kg (135 lb)  Body mass index is 20.53 kg/m².    No intake or output data in the 24 hours ending 04/15/23 1554    Physical Exam  Vitals and nursing note reviewed.   Constitutional:       General: He is awake.       Appearance: Normal appearance.   HENT:      Head: Normocephalic.      Mouth/Throat:      Mouth: Mucous membranes are moist.   Eyes:      General: Lids are normal. Scleral icterus present.      Extraocular Movements: Extraocular movements intact.      Pupils: Pupils are equal, round, and reactive to light.   Cardiovascular:      Rate and Rhythm: Normal rate and regular rhythm.      Pulses: Normal pulses.      Heart sounds: Normal heart sounds.   Pulmonary:      Effort: Pulmonary effort is normal.      Breath sounds: Normal breath sounds.   Abdominal:      General: Bowel sounds are normal.      Palpations: Abdomen is soft.      Tenderness: There is no abdominal tenderness.   Musculoskeletal:         General: Normal range of motion.      Cervical back: Neck supple.      Right lower leg: No edema.      Left lower leg: No edema.   Skin:     General: Skin is warm and dry.      Capillary Refill: Capillary refill takes less than 2 seconds.   Neurological:      Mental Status: He is alert and oriented to person, place, and time. Mental status is at baseline.      GCS: GCS eye subscore is 4. GCS verbal subscore is 5. GCS motor subscore is 6.      Sensory: Sensation is intact.      Motor: Motor function is intact.   Psychiatric:         Mood and Affect: Mood normal.         Speech: Speech normal.         Behavior: Behavior normal. Behavior is cooperative.       Vents:  Oxygen Concentration (%): 100 (04/15/23 1128)    Lines/Drains/Airways       Peripheral Intravenous Line  Duration                  Peripheral IV - Single Lumen 04/15/23 1053 20 G Left;Posterior Wrist <1 day         Peripheral IV - Single Lumen 04/15/23 1054 18 G Right Antecubital <1 day                    Significant Labs:    CBC/Anemia Profile:  Recent Labs   Lab 04/15/23  1104   WBC 16.98*   HGB 15.6   HCT 48.3      MCV 90.8   RDW 14.5        Chemistries:  Recent Labs   Lab 04/15/23  1104      K 4.3      CO2 30   BUN 7   CREATININE 0.94    CALCIUM 8.4*   ALBUMIN 2.9*   PROT 6.0*   BILITOT 0.5   ALKPHOS 55   ALT 47   AST 42*       All pertinent labs within the past 24 hours have been reviewed.    Significant Imaging:   I have reviewed all pertinent imaging results/findings within the past 24 hours.

## 2023-04-15 NOTE — H&P
[facility] - Cath Lab  History & Physical    Subjective:      Chief Complaint/Reason for Admission: chest pain    Compa Paredes is a 37 y.o. male who presents with approximately 90 mins of chest pain, mid sternal, non-radiating, associated with shortness of breath, nausea and diaphoresis, 10/10 at most severe, radiating into left bicep.  Chest pain improved with initial medical tx in ER, 7/10 when presented to cath lab.  Pt denies previous cardiac history, risk factors positive for male gender, polysubstance abuse.  He reports stopped using cocaine several weeks ago, however has continued to drink at least two     History reviewed. No pertinent past medical history.  History reviewed. No pertinent surgical history.  History reviewed. No pertinent family history.  Social History     Tobacco Use    Smoking status: Every Day     Packs/day: 1.00     Types: Cigarettes    Smokeless tobacco: Never   Substance Use Topics    Alcohol use: Yes    Drug use: Not Currently     Types: Cocaine       PTA Medications   Medication Sig    ABILIFY 5 mg Tab Take 5 mg by mouth.    ZOLOFT 100 mg tablet Take 100 mg by mouth.     Review of patient's allergies indicates:  No Known Allergies     Review of Systems   Constitutional: Negative.    HENT: Negative.     Eyes: Negative.    Respiratory: Negative.     Cardiovascular:  Positive for chest pain.   Gastrointestinal:  Positive for heartburn and nausea.   Genitourinary: Negative.    Musculoskeletal: Negative.    Skin: Negative.    Neurological: Negative.    Endo/Heme/Allergies: Negative.    Psychiatric/Behavioral: Negative.       Objective:      Vital Signs (Most Recent)  Temp: 97.1 °F (36.2 °C) (04/15/23 1058)  Pulse: 78 (04/15/23 1122)  Resp: 12 (04/15/23 1122)  BP: 133/89 (04/15/23 1122)  SpO2: 100 % (04/15/23 1122)    Vital Signs Range (Last 24H):  Temp:  [97.1 °F (36.2 °C)]   Pulse:  [78-94]   Resp:  [12-22]   BP: (133-149)/()   SpO2:  [100 %]     Physical  Exam  Constitutional:       Appearance: He is ill-appearing and diaphoretic.   HENT:      Head: Normocephalic and atraumatic.      Right Ear: External ear normal.      Left Ear: External ear normal.      Nose: Nose normal.   Eyes:      General: Scleral icterus present.      Extraocular Movements: Extraocular movements intact.      Conjunctiva/sclera: Conjunctivae normal.      Pupils: Pupils are equal, round, and reactive to light.   Neck:      Vascular: No carotid bruit.   Cardiovascular:      Rate and Rhythm: Tachycardia present.      Pulses: Normal pulses.      Heart sounds: Normal heart sounds.   Pulmonary:      Effort: Pulmonary effort is normal.      Breath sounds: Normal breath sounds.   Abdominal:      General: Abdomen is flat. Bowel sounds are normal.      Palpations: Abdomen is soft.   Musculoskeletal:         General: Normal range of motion.      Cervical back: Normal range of motion and neck supple.   Skin:     General: Skin is warm.      Capillary Refill: Capillary refill takes less than 2 seconds.   Neurological:      General: No focal deficit present.      Mental Status: He is alert.   Psychiatric:         Mood and Affect: Mood normal.         Behavior: Behavior normal.       Data Review:  CBC:   Lab Results   Component Value Date    WBC 16.98 (H) 04/15/2023    RBC 5.32 04/15/2023    HGB 15.6 04/15/2023    HCT 48.3 04/15/2023     04/15/2023     BMP:   Lab Results   Component Value Date     (H) 04/15/2023     04/15/2023    K 4.3 04/15/2023     04/15/2023    CO2 30 04/15/2023    BUN 7 04/15/2023    CREATININE 0.94 04/15/2023    CALCIUM 8.4 (L) 04/15/2023      ECG: Sinus Tachy, ST seg elevation ant leads consistent with acute ant STEMI.     Assessment:      STEMI: ongoing chest pain, NSVT, recommend emergent LHC/poss, risks and benefits discussed, pt elects to proceed.  Polysubstance abuse, tobacco, alcohol, admits to recent (3 wks) cocaine abuse.       Plan:    Discussed  options, recommend emergent LHC/poss, risks and benefits discussed, pt elects to proceed.      H&P completed pre procedure, documented post procedure to facilitate emergent intervention.

## 2023-04-16 PROBLEM — I47.20 VENTRICULAR TACHYCARDIA: Status: ACTIVE | Noted: 2023-04-16

## 2023-04-16 LAB
ALBUMIN SERPL BCP-MCNC: 2.5 G/DL (ref 3.5–5)
ALBUMIN/GLOB SERPL: 1.1 {RATIO}
ALP SERPL-CCNC: 54 U/L (ref 45–115)
ALT SERPL W P-5'-P-CCNC: 35 U/L (ref 16–61)
ANION GAP SERPL CALCULATED.3IONS-SCNC: 10 MMOL/L (ref 7–16)
AST SERPL W P-5'-P-CCNC: 74 U/L (ref 15–37)
BASOPHILS # BLD AUTO: 0.06 K/UL (ref 0–0.2)
BASOPHILS NFR BLD AUTO: 0.5 % (ref 0–1)
BILIRUB SERPL-MCNC: 0.3 MG/DL (ref ?–1.2)
BUN SERPL-MCNC: 9 MG/DL (ref 7–18)
BUN/CREAT SERPL: 10 (ref 6–20)
CALCIUM SERPL-MCNC: 8.4 MG/DL (ref 8.5–10.1)
CHLORIDE SERPL-SCNC: 110 MMOL/L (ref 98–107)
CHOLEST SERPL-MCNC: 113 MG/DL (ref 0–200)
CHOLEST/HDLC SERPL: 2.3 {RATIO}
CO2 SERPL-SCNC: 26 MMOL/L (ref 21–32)
CREAT SERPL-MCNC: 0.89 MG/DL (ref 0.7–1.3)
DIFFERENTIAL METHOD BLD: ABNORMAL
EGFR (NO RACE VARIABLE) (RUSH/TITUS): 114 ML/MIN/1.73M²
EOSINOPHIL # BLD AUTO: 0.1 K/UL (ref 0–0.5)
EOSINOPHIL NFR BLD AUTO: 0.8 % (ref 1–4)
ERYTHROCYTE [DISTWIDTH] IN BLOOD BY AUTOMATED COUNT: 14.6 % (ref 11.5–14.5)
EST. AVERAGE GLUCOSE BLD GHB EST-MCNC: 94 MG/DL
GLOBULIN SER-MCNC: 2.3 G/DL (ref 2–4)
GLUCOSE SERPL-MCNC: 104 MG/DL (ref 70–105)
GLUCOSE SERPL-MCNC: 109 MG/DL (ref 70–105)
GLUCOSE SERPL-MCNC: 81 MG/DL (ref 70–105)
GLUCOSE SERPL-MCNC: 83 MG/DL (ref 74–106)
GLUCOSE SERPL-MCNC: 91 MG/DL (ref 70–105)
HBA1C MFR BLD HPLC: 5.4 % (ref 4.5–6.6)
HCT VFR BLD AUTO: 43.1 % (ref 40–54)
HDLC SERPL-MCNC: 50 MG/DL (ref 40–60)
HGB BLD-MCNC: 14.1 G/DL (ref 13.5–18)
IMM GRANULOCYTES # BLD AUTO: 0.06 K/UL (ref 0–0.04)
IMM GRANULOCYTES NFR BLD: 0.5 % (ref 0–0.4)
LDLC SERPL CALC-MCNC: 42 MG/DL
LYMPHOCYTES # BLD AUTO: 3.5 K/UL (ref 1–4.8)
LYMPHOCYTES NFR BLD AUTO: 29 % (ref 27–41)
MCH RBC QN AUTO: 29.7 PG (ref 27–31)
MCHC RBC AUTO-ENTMCNC: 32.7 G/DL (ref 32–36)
MCV RBC AUTO: 90.7 FL (ref 80–96)
MONOCYTES # BLD AUTO: 0.89 K/UL (ref 0–0.8)
MONOCYTES NFR BLD AUTO: 7.4 % (ref 2–6)
MPC BLD CALC-MCNC: 9.4 FL (ref 9.4–12.4)
NEUTROPHILS # BLD AUTO: 7.45 K/UL (ref 1.8–7.7)
NEUTROPHILS NFR BLD AUTO: 61.8 % (ref 53–65)
NONHDLC SERPL-MCNC: 63 MG/DL
NRBC # BLD AUTO: 0 X10E3/UL
NRBC, AUTO (.00): 0 %
PLATELET # BLD AUTO: 316 K/UL (ref 150–400)
POTASSIUM SERPL-SCNC: 4 MMOL/L (ref 3.5–5.1)
PROT SERPL-MCNC: 4.8 G/DL (ref 6.4–8.2)
RBC # BLD AUTO: 4.75 M/UL (ref 4.6–6.2)
SODIUM SERPL-SCNC: 142 MMOL/L (ref 136–145)
TRIGL SERPL-MCNC: 105 MG/DL (ref 35–150)
VLDLC SERPL-MCNC: 21 MG/DL
WBC # BLD AUTO: 12.06 K/UL (ref 4.5–11)

## 2023-04-16 PROCEDURE — 63600175 PHARM REV CODE 636 W HCPCS: Performed by: INTERNAL MEDICINE

## 2023-04-16 PROCEDURE — 25000003 PHARM REV CODE 250: Performed by: NURSE PRACTITIONER

## 2023-04-16 PROCEDURE — 85025 COMPLETE CBC W/AUTO DIFF WBC: CPT | Performed by: INTERNAL MEDICINE

## 2023-04-16 PROCEDURE — 99233 SBSQ HOSP IP/OBS HIGH 50: CPT | Mod: GT,,, | Performed by: INTERNAL MEDICINE

## 2023-04-16 PROCEDURE — 94761 N-INVAS EAR/PLS OXIMETRY MLT: CPT

## 2023-04-16 PROCEDURE — 99233 PR SUBSEQUENT HOSPITAL CARE,LEVL III: ICD-10-PCS | Mod: GT,,, | Performed by: INTERNAL MEDICINE

## 2023-04-16 PROCEDURE — 11000001 HC ACUTE MED/SURG PRIVATE ROOM

## 2023-04-16 PROCEDURE — 82962 GLUCOSE BLOOD TEST: CPT

## 2023-04-16 PROCEDURE — 80053 COMPREHEN METABOLIC PANEL: CPT | Performed by: INTERNAL MEDICINE

## 2023-04-16 PROCEDURE — 25000003 PHARM REV CODE 250: Performed by: INTERNAL MEDICINE

## 2023-04-16 PROCEDURE — 83036 HEMOGLOBIN GLYCOSYLATED A1C: CPT | Performed by: NURSE PRACTITIONER

## 2023-04-16 PROCEDURE — 80061 LIPID PANEL: CPT | Performed by: NURSE PRACTITIONER

## 2023-04-16 RX ADMIN — MUPIROCIN: 20 OINTMENT TOPICAL at 08:04

## 2023-04-16 RX ADMIN — ASPIRIN 81 MG 81 MG: 81 TABLET ORAL at 08:04

## 2023-04-16 RX ADMIN — CARVEDILOL 3.12 MG: 3.12 TABLET, FILM COATED ORAL at 08:04

## 2023-04-16 RX ADMIN — TICAGRELOR 90 MG: 90 TABLET ORAL at 08:04

## 2023-04-16 RX ADMIN — MORPHINE SULFATE 2 MG: 2 INJECTION, SOLUTION INTRAMUSCULAR; INTRAVENOUS at 10:04

## 2023-04-16 RX ADMIN — MORPHINE SULFATE 2 MG: 2 INJECTION, SOLUTION INTRAMUSCULAR; INTRAVENOUS at 11:04

## 2023-04-16 RX ADMIN — ATORVASTATIN CALCIUM 40 MG: 40 TABLET, FILM COATED ORAL at 08:04

## 2023-04-16 NOTE — PLAN OF CARE
[facility] - Orthopedic  Initial Discharge Assessment       Primary Care Provider: Primary Doctor No    Admission Diagnosis: Arrhythmia [I49.9]  STEMI (ST elevation myocardial infarction) [I21.3]  Chest pain [R07.9]  ST elevation myocardial infarction (STEMI), unspecified artery [I21.3]    Admission Date: 4/15/2023  Expected Discharge Date:     Discharge Barriers Identified: (P) Unisured    Payor: /     Extended Emergency Contact Information  Primary Emergency Contact: Lupe Paredes  Mobile Phone: [phone]  Relation: Mother  Preferred language: English   needed? No    Discharge Plan A: Home, Other (Cardiac Rehab)  Discharge Plan B: Home, Other (Cardiac Rehab)    No Pharmacies Listed    Initial Assessment (most recent)       Adult Discharge Assessment - 04/16/23 1255          Discharge Assessment    Assessment Type Discharge Planning Assessment     Confirmed/corrected address, phone number and insurance Yes     Confirmed Demographics Correct on Facesheet     Source of Information patient     Communicated AMY with patient/caregiver Date not available/Unable to determine     People in Home alone     Do you expect to return to your current living situation? Yes     Do you have help at home or someone to help you manage your care at home? No     Prior to hospitilization cognitive status: Unable to Assess     Current cognitive status: Alert/Oriented     Walking or Climbing Stairs --   No difficulty reported by pt    Dressing/Bathing --   No difficulty reported    Do you have any problems with: --   No problems per pt    Equipment Currently Used at Home none     Readmission within 30 days? No     Patient currently being followed by outpatient case management? No     Do you currently have service(s) that help you manage your care at home? No     Do you take prescription medications? Yes     Do you have prescription coverage? No     Do you have any problems affording any of your prescribed  medications? No     Is the patient taking medications as prescribed? yes     Who is going to help you get home at discharge? Family     How do you get to doctors appointments? car, drives self     Are you on dialysis? No     Do you take coumadin? No     Discharge Plan A Home;Other   Cardiac Rehab    Discharge Plan B Home;Other   Cardiac Rehab    DME Needed Upon Discharge  none     Discharge Plan discussed with: Patient     Discharge Barriers Identified Unisured (P)         Physical Activity    On average, how many days per week do you engage in moderate to strenuous exercise (like a brisk walk)? 0 days (P)      On average, how many minutes do you engage in exercise at this level? 0 min (P)         Financial Resource Strain    How hard is it for you to pay for the very basics like food, housing, medical care, and heating? Not very hard (P)         Housing Stability    In the last 12 months, was there a time when you were not able to pay the mortgage or rent on time? No (P)      In the last 12 months, how many places have you lived? 1 (P)      In the last 12 months, was there a time when you did not have a steady place to sleep or slept in a shelter (including now)? No (P)         Transportation Needs    In the past 12 months, has lack of transportation kept you from medical appointments or from getting medications? No (P)      In the past 12 months, has lack of transportation kept you from meetings, work, or from getting things needed for daily living? No (P)         Food Insecurity    Within the past 12 months, you worried that your food would run out before you got the money to buy more. Never true (P)      Within the past 12 months, the food you bought just didn't last and you didn't have money to get more. Never true (P)         Stress    Do you feel stress - tense, restless, nervous, or anxious, or unable to sleep at night because your mind is troubled all the time - these days? 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 Never (P)      How often do you get together with friends or relatives? Never (P)      How often do you attend Adventism or Sikhism services? Never (P)      Do you belong to any clubs or organizations such as Adventism groups, unions, fraternal or athletic groups, or school groups? No (P)      How often do you attend meetings of the clubs or organizations you belong to? Never (P)      Are you , , , , never , or living with a partner?  (P)         Alcohol Use    Q1: How often do you have a drink containing alcohol? 4 or more times a week (P)      Q2: How many drinks containing alcohol do you have on a typical day when you are drinking? -- (P)    Pt states he drinks "two pints" daily    Q3: How often do you have six or more drinks on one occasion? Daily or almost daily (P)                  SW received consult for cardiac rehab. Pt lives alone, has no home health and uses no durable medical equipment.  Discharge plans are to return home when medically stable. Choice obtained for [facility] for Cardiac rehab and referral faxed.  SS will continue to follow for discharge needs.               "

## 2023-04-16 NOTE — PROGRESS NOTES
Cardiology Progress Note    Referring Physician: none  Primary Care Physician: [unfilled]         Chief Complaint: Chest pain    History of Present Illness: Pt presented to ER with ongoing chest pain, found to have acute anterior STEMI, taken emergently to cath lab for PCI dominant D1, chest pain resolved post procedure.     Review of Systems:  Cardiovascular ROS: no chest pain or dyspnea on exertion     Echocardiogram:   No results found for this or any previous visit.      Stress Test:  No results found for this or any previous visit.       Last catheterization:  Results for orders placed during the hospital encounter of 04/15/23    Cardiac catheterization    Conclusion    The 1st Diag lesion was 99% stenosed with 0% stenosis post-intervention.    The ejection fraction was calculated to be 45%.    The left ventricular systolic function was normal.    The left ventricular end diastolic pressure was normal.    The pre-procedure left ventricular end diastolic pressure was 18.    A stent was successfully placed at 15 GEOFF for 17 sec.    A stent was successfully placed.    The estimated blood loss was none.    There was single vessel coronary artery disease.    Severe proximal D1 disease undergoing successful primary PCi with PEARL, 0% residual stenosis.       Medications:  Current Facility-Administered Medications   Medication Dose Route Frequency Provider Last Rate Last Admin    acetaminophen tablet 650 mg  650 mg Oral Q4H PRN Pelon Zamora DO   650 mg at 04/15/23 1702    aspirin chewable tablet 81 mg  81 mg Oral Daily Patti Telma, FNP-AGACNP   81 mg at 04/16/23 0838    atorvastatin tablet 40 mg  40 mg Oral QHS Patti Telma, FNP-AGACNP   40 mg at 04/15/23 2144    carvediloL tablet 3.125 mg  3.125 mg Oral BID Pelon Zamora DO   3.125 mg at 04/16/23 0837    morphine injection 2 mg  2 mg Intravenous Q4H PRN Pelon Zamora DO   2 mg at 04/16/23 1105    mupirocin 2 % ointment   Nasal BID Pelon Zamora DO   Given at  "04/16/23 0839    ondansetron disintegrating tablet 8 mg  8 mg Oral Q8H PRN Pelon Zamora DO        ticagrelor tablet 90 mg  90 mg Oral BID Pelon DALE Zamora DO   90 mg at 04/16/23 0837          Physical Exam:  VS: /75   Pulse 64   Temp 98 °F (36.7 °C)   Resp 18   Ht 5' 8" (1.727 m)   Wt 62.5 kg (137 lb 12.6 oz)   SpO2 100%   BMI 20.95 kg/m²   Physical Exam   Constitutional: normal appearance. He appears obese.   HENT:   Right Ear: External ear normal.   Left Ear: External ear normal.   Eyes: Pupils are equal, round, and reactive to light. Conjunctivae are normal.   Cardiovascular: Normal rate and regular rhythm.   Abdominal: Soft. Normal appearance and bowel sounds are normal.   Right groin access sit, dry, no hematoma   Musculoskeletal:         General: Normal range of motion.      Cervical back: Normal range of motion and neck supple.   Neurological: He is alert.      Diagnostic studies reviewed up until the time of this note.     ECG and cardiac telemetry reviewed.     Assessment and Plan:  ST elevation myocardial infarction (STEMI), unspecified artery  -     Neuro checks:  LOC, Orientation, GCS; Standing  -     Vital signs; Standing  -     Cardiac Monitoring - Adult; Standing  -     Assess for bleeding; Standing  -     Assess neurologic status; Standing  -     Vascular access site assessment and bilateral pulses; Standing  -     Intake and output; Standing  -     Collagen closure plug/device; Standing  -     Notify Physician; Standing  -     Notify physician; Standing  -     Notify physician ; Standing  -     Notify physician; Standing  -     Notify physician ; Standing  -     Notify physician ; Standing  -     Cancel: Diet NPO; Standing  -     Diet Cardiac; Standing  -     Comprehensive metabolic panel; Standing  -     CBC auto differential; Standing  -     POCT glucose; Standing  -     Oxygen PRN; Standing  -     Pulse Oximetry Q4H; Standing  -     Ambulatory referral/consult to Cardiac Rehab; " Future; Expected date: 04/22/2023  -     Inpatient consult to Critical Care Medicine; Standing    Chest pain  -     EKG 12-lead; Standing  -     X-Ray Chest AP Portable; Standing  -     Neuro checks:  LOC, Orientation, GCS; Standing  -     Vital signs; Standing  -     Cardiac Monitoring - Adult; Standing  -     Assess for bleeding; Standing  -     Assess neurologic status; Standing  -     Vascular access site assessment and bilateral pulses; Standing  -     Intake and output; Standing  -     Collagen closure plug/device; Standing  -     Notify Physician; Standing  -     Notify physician; Standing  -     Notify physician ; Standing  -     Notify physician; Standing  -     Notify physician ; Standing  -     Notify physician ; Standing  -     Cancel: Diet NPO; Standing  -     Diet Cardiac; Standing  -     Comprehensive metabolic panel; Standing  -     CBC auto differential; Standing  -     POCT glucose; Standing  -     Oxygen PRN; Standing  -     Pulse Oximetry Q4H; Standing  -     Ambulatory referral/consult to Cardiac Rehab; Future; Expected date: 04/22/2023    Arrhythmia  -     EKG 12-lead; Standing  -     Neuro checks:  LOC, Orientation, GCS; Standing  -     Vital signs; Standing  -     Cardiac Monitoring - Adult; Standing  -     Assess for bleeding; Standing  -     Assess neurologic status; Standing  -     Vascular access site assessment and bilateral pulses; Standing  -     Intake and output; Standing  -     Collagen closure plug/device; Standing  -     Notify Physician; Standing  -     Notify physician; Standing  -     Notify physician ; Standing  -     Notify physician; Standing  -     Notify physician ; Standing  -     Notify physician ; Standing  -     Cancel: Diet NPO; Standing  -     Diet Cardiac; Standing  -     Comprehensive metabolic panel; Standing  -     CBC auto differential; Standing  -     POCT glucose; Standing  -     Oxygen PRN; Standing  -     Pulse Oximetry Q4H; Standing  -     Ambulatory  referral/consult to Cardiac Rehab; Future; Expected date: 04/22/2023    STEMI (ST elevation myocardial infarction)  -     Cancel: Cardiac catheterization  -     Cardiac catheterization; Standing  -     Echo; Standing  -     EKG 12-lead; Standing    Other orders  -     morphine injection 4 mg  -     ondansetron injection 4 mg  -     Discontinue: heparin (PORCINE) bolus from bag  -     Discontinue: ticagrelor tablet  -     ticagrelor (BRILINTA) 90 mg tablet  -     heparin (porcine) 5,000 unit/mL injection  -     aspirin 81 MG chewable tablet  -     Discontinue: aspirin tablet  -     Cancel: Pulse Oximetry Continuous; Standing  -     CBC auto differential; Standing  -     Comprehensive metabolic panel; Standing  -     Troponin I; Standing  -     Protime-INR; Standing  -     Saline lock IV; Standing  -     Cancel: Cardiac Monitoring - Adult; Standing  -     Drug Screen, Urine; Standing  -     EXTRA TUBES; Standing  -     Oxygen Continuous; Standing  -     Discontinue: midazolam (VERSED) 1 mg/mL injection  -     Discontinue: fentaNYL injection  -     Discontinue: amiodarone (CORDARONE) 150 mg in dextrose 5 % (D5W) 100 mL bolus  -     Discontinue: DOPamine 800 mg in dextrose 5 % 250 mL infusion (premix)  -     Discontinue: heparin (porcine) injection  -     Cancel: Bed rest; Standing  -     Elevate HOB 30; Standing  -     0.45% NaCl infusion  -     acetaminophen tablet 650 mg  -     ondansetron disintegrating tablet 8 mg  -     Discontinue: ticagrelor tablet 90 mg  -     ticagrelor tablet 180 mg  -     carvediloL tablet 3.125 mg  -     lisinopriL (PRINIVIL,ZESTRIL) 5 MG tablet; Take 1 tablet (5 mg total) by mouth once daily.  Dispense: 90 tablet; Refill: 3  -     rosuvastatin (CRESTOR) 40 MG Tab; Take 1 tablet (40 mg total) by mouth every evening.  Dispense: 90 tablet; Refill: 3  -     Cancel: Lipid Panel; Standing  -     Cancel: Hemoglobin A1C; Standing  -     Hemoglobin A1C; Standing  -     Lipid Panel; Standing  -      atorvastatin tablet 40 mg  -     aspirin chewable tablet 81 mg  -     POCT glucose; Standing  -     Discontinue: LIDOcaine HCL 10 mg/ml (1%) injection  -     Admit to Inpatient; Standing  -     Diet message; Standing  -     morphine injection 2 mg  -     mupirocin 2 % ointment  -     ticagrelor tablet 90 mg  -     POCT glucose; Standing  -     Transfer patient; Standing  -     POCT glucose; Standing  -     Inpatient consult to Social Work; Standing  -     POCT glucose; Standing           IMP/plan                1. STEMI: anterior, post PCI, chest pain free, starting to ambulate, will order echo for AM, home Monday if pain free, no further arrhythmia               2. Polysubstance abuse, will discuss with  possible outpatient treatment programs.           3. V tach has resolved after revascularization                                                                                          Pelon Zamora DO, FACC, FACOI  [facility]

## 2023-04-16 NOTE — NURSING
Pt. Transferred to room 457 per hospital bed. Pt. Secured in bed in room. Lauren RN in room with patient. No distress noted. Pt. Denies chest pain. Report called to Lauren RN before transport.

## 2023-04-17 VITALS
HEART RATE: 72 BPM | HEIGHT: 68 IN | SYSTOLIC BLOOD PRESSURE: 133 MMHG | OXYGEN SATURATION: 99 % | TEMPERATURE: 98 F | RESPIRATION RATE: 20 BRPM | BODY MASS INDEX: 20.89 KG/M2 | WEIGHT: 137.81 LBS | DIASTOLIC BLOOD PRESSURE: 79 MMHG

## 2023-04-17 LAB
AORTIC ROOT ANNULUS: 2.5 CM
AORTIC VALVE CUSP SEPERATION: 1.97 CM
AV INDEX (PROSTH): 0.67
AV MEAN GRADIENT: 2 MMHG
AV PEAK GRADIENT: 3 MMHG
AV VALVE AREA: 1.7 CM2
AV VELOCITY RATIO: 0.75
BSA FOR ECHO PROCEDURE: 1.72 M2
CV ECHO LV RWT: 0.36 CM
DOP CALC AO PEAK VEL: 0.8 M/S
DOP CALC AO VTI: 15 CM
DOP CALC LVOT AREA: 2.5 CM2
DOP CALC LVOT DIAMETER: 1.8 CM
DOP CALC LVOT PEAK VEL: 0.6 M/S
DOP CALC LVOT STROKE VOLUME: 25.43 CM3
DOP CALC MV VTI: 143.6 CM
DOP CALCLVOT PEAK VEL VTI: 10 CM
E WAVE DECELERATION TIME: 150 MSEC
ECHO EF ESTIMATED: 55 %
ECHO LV POSTERIOR WALL: 0.88 CM (ref 0.6–1.1)
EJECTION FRACTION: 55 %
FRACTIONAL SHORTENING: 28 % (ref 28–44)
GLUCOSE SERPL-MCNC: 99 MG/DL (ref 70–105)
INTERVENTRICULAR SEPTUM: 0.91 CM (ref 0.6–1.1)
IVC OSTIUM: 1.3 CM
LEFT ATRIUM SIZE: 3 CM
LEFT INTERNAL DIMENSION IN SYSTOLE: 3.47 CM (ref 2.1–4)
LEFT VENTRICLE DIASTOLIC VOLUME INDEX: 63.33 ML/M2
LEFT VENTRICLE DIASTOLIC VOLUME: 110.2 ML
LEFT VENTRICLE MASS INDEX: 86 G/M2
LEFT VENTRICLE SYSTOLIC VOLUME INDEX: 28.6 ML/M2
LEFT VENTRICLE SYSTOLIC VOLUME: 49.8 ML
LEFT VENTRICULAR INTERNAL DIMENSION IN DIASTOLE: 4.85 CM (ref 3.5–6)
LEFT VENTRICULAR MASS: 149.25 G
LVOT MG: 1 MMHG
MV MEAN GRADIENT: 69 MMHG
MV PEAK E VEL: 3.07 M/S
MV PEAK GRADIENT: 109 MMHG
MV VALVE AREA BY CONTINUITY EQUATION: 0.18 CM2
PISA TR MAX VEL: 1.9 M/S
RA MAJOR: 3.3 CM
RA PRESSURE: 3 MMHG
RIGHT VENTRICULAR END-DIASTOLIC DIMENSION: 2.9 CM
TR MAX PG: 14 MMHG
TRICUSPID ANNULAR PLANE SYSTOLIC EXCURSION: 1.7 CM
TV REST PULMONARY ARTERY PRESSURE: 17 MMHG

## 2023-04-17 PROCEDURE — 25000003 PHARM REV CODE 250: Performed by: INTERNAL MEDICINE

## 2023-04-17 PROCEDURE — 25000003 PHARM REV CODE 250: Performed by: NURSE PRACTITIONER

## 2023-04-17 PROCEDURE — 99239 HOSP IP/OBS DSCHRG MGMT >30: CPT | Mod: ,,, | Performed by: NURSE PRACTITIONER

## 2023-04-17 PROCEDURE — 82962 GLUCOSE BLOOD TEST: CPT

## 2023-04-17 PROCEDURE — 99239 PR HOSPITAL DISCHARGE DAY,>30 MIN: ICD-10-PCS | Mod: ,,, | Performed by: NURSE PRACTITIONER

## 2023-04-17 RX ORDER — NAPROXEN SODIUM 220 MG/1
81 TABLET, FILM COATED ORAL DAILY
Qty: 90 TABLET | Refills: 3 | Status: SHIPPED | OUTPATIENT
Start: 2023-04-18 | End: 2023-05-09

## 2023-04-17 RX ORDER — CARVEDILOL 6.25 MG/1
6.25 TABLET ORAL 2 TIMES DAILY
Qty: 60 TABLET | Refills: 11 | Status: SHIPPED | OUTPATIENT
Start: 2023-04-17 | End: 2023-05-09

## 2023-04-17 RX ORDER — ATORVASTATIN CALCIUM 40 MG/1
40 TABLET, FILM COATED ORAL NIGHTLY
Qty: 90 TABLET | Refills: 3 | Status: SHIPPED | OUTPATIENT
Start: 2023-04-17 | End: 2023-05-09

## 2023-04-17 RX ORDER — CLOPIDOGREL BISULFATE 75 MG/1
75 TABLET ORAL DAILY
Qty: 90 TABLET | Refills: 3 | Status: SHIPPED | OUTPATIENT
Start: 2023-04-18 | End: 2023-05-09

## 2023-04-17 RX ORDER — CLOPIDOGREL BISULFATE 75 MG/1
75 TABLET ORAL DAILY
Status: DISCONTINUED | OUTPATIENT
Start: 2023-04-18 | End: 2023-04-17 | Stop reason: HOSPADM

## 2023-04-17 RX ORDER — CLOPIDOGREL BISULFATE 75 MG/1
300 TABLET ORAL ONCE
Status: COMPLETED | OUTPATIENT
Start: 2023-04-17 | End: 2023-04-17

## 2023-04-17 RX ORDER — CARVEDILOL 6.25 MG/1
6.25 TABLET ORAL 2 TIMES DAILY
Status: DISCONTINUED | OUTPATIENT
Start: 2023-04-17 | End: 2023-04-17 | Stop reason: HOSPADM

## 2023-04-17 RX ADMIN — TICAGRELOR 90 MG: 90 TABLET ORAL at 09:04

## 2023-04-17 RX ADMIN — CARVEDILOL 3.12 MG: 3.12 TABLET, FILM COATED ORAL at 09:04

## 2023-04-17 RX ADMIN — ASPIRIN 81 MG 81 MG: 81 TABLET ORAL at 09:04

## 2023-04-17 RX ADMIN — MUPIROCIN: 20 OINTMENT TOPICAL at 09:04

## 2023-04-17 RX ADMIN — CLOPIDOGREL BISULFATE 300 MG: 75 TABLET ORAL at 11:04

## 2023-04-17 NOTE — PLAN OF CARE
Consult acknowledged for request for alcohol rehabilitation resources. Sw left handout with information about outpatient tx vs inpatient tx, as well as, list of tx facilities in [place], [place]. Pt was not at bedside. Packet of information left on bed. Ss following.

## 2023-04-17 NOTE — PLAN OF CARE
Consult acknowledged for cardiac rehab. Referral sent on yesterday to [facility]. Edson spoke with Amada at [facility] on this morning. Amada requested cath report, which was sent. Amada awaiting dc orders at this time. Edson to send when available. Ss following.

## 2023-04-17 NOTE — ASSESSMENT & PLAN NOTE
- Social service consulted for information regarding inpatient vs outpatient treatment options for alcohol abuse programs

## 2023-04-17 NOTE — PLAN OF CARE
[facility]  Discharge Final Note    Primary Care Provider: Primary Doctor No    Expected Discharge Date: 4/17/2023    Final Discharge Note (most recent)       Final Note - 04/17/23 1344          Final Note    Assessment Type Final Discharge Note     Anticipated Discharge Disposition Home or Self Care     What phone number can be called within the next 1-3 days to see how you are doing after discharge? [phone]        Post-Acute Status    Discharge Delays None known at this time                     Important Message from Medicare             Contact Info       MICHELLE Mcclure   Specialty: Family Medicine, Cardiology    [address] [facility] Cardiothoracic Surgery  [facility] [address]   Phone: [phone]       Next Steps: Follow up in 2 week(s)    Instructions: Schedule follow up with cardiologyPerla FNP for 2 weeks; HD, STEMI  Please follow up on May 9 at 1:00          Pt dc home with self care. Cardiac rehab referral/ dc orders faxed to [facility] cardiac rehab. Facility to make tx arrangements with pt.

## 2023-04-17 NOTE — ASSESSMENT & PLAN NOTE
- Severe proximal D1 disease undergoing successful primary PCi with PEARL, 0% residual stenosis.  - Discharge home on Coreg 6.25mg BID, Plavix 75mg daily, asa 81mg daily, and atorvastatin 40mg daily  - LDL 42  - Prelim echo with EF 55%, no ace/arb prescribed  - Follow up with MICHELLE Lincoln in 2 weeks

## 2023-04-17 NOTE — DISCHARGE SUMMARY
[facility]  Cardiology  Discharge Summary      Patient Name: Compa Paredes  MRN: [id number]  Admission Date: 4/15/2023  Hospital Length of Stay: 2 days  Discharge Date and Time:  04/17/2023 1:36 PM  Attending Physician: No att. providers found    Discharging Provider: MICHELLE Mcclure  Primary Care Physician: Primary Doctor Elisa    HPI:   No notes on file    Procedure(s) (LRB):  Angiogram, Coronary, with Left Heart Cath  Percutaneous coronary intervention (N/A)     Indwelling Lines/Drains at time of discharge:  Lines/Drains/Airways     None                 Hospital Course:  Compa Paredes is a 37 y.o. male who presents with approximately 90 mins of chest pain, mid sternal, non-radiating, associated with shortness of breath, nausea and diaphoresis, 10/10 at most severe, radiating into left bicep.  Chest pain improved with initial medical tx in ER, 7/10 when presented to cath lab.  Pt denies previous cardiac history, risk factors positive for male gender, polysubstance abuse.  He reports stopped using cocaine several weeks ago, however has continued to drink at least two pints/day.    4/16/2023: Pt presented to ER with ongoing chest pain, found to have acute anterior STEMI, taken emergently to cath lab for PCI dominant D1, chest pain resolved post procedure.  Plan:  1. STEMI: anterior, post PCI, chest pain free, starting to ambulate, will order echo for AM, home Monday if pain free, no further arrhythmia                                                       2. Polysubstance abuse, will discuss with  possible outpatient treatment programs.                             3. V tach has resolved after revascularization    4/17/2023: Patient seen today, denies chest pain or sob. Right groin stable, no bleeding or hematoma. Patient has no insurance, loaded with plavix 300mg today, start 75mg daily tomorrow.  Social service consulted for cardiac rehab (pt likely will not be  able to afford) and for information regarding inpatient vs outpatient alcohol programs to quit drinking as requested by patient and his mother. He is hemodynamically stable and ready for discharge.      Goals of Care Treatment Preferences:         Consults:   Consults (From admission, onward)        Status Ordering Provider     Inpatient consult to Social Work  Once        Provider:  (Not yet assigned)    Completed LEONID MCKEON     Inpatient consult to Social Work  Once        Provider:  (Not yet assigned)    Completed LEONID MCKEON     Inpatient consult to Social Work  Once        Provider:  (Not yet assigned)    Completed SHAZIA MARTINI     Inpatient consult to Critical Care Medicine  Once        Provider:  Angel Domingo MD    Acknowledged AYAN FERNANDES          Significant Diagnostic Studies: Labs:   BMP:   Recent Labs   Lab 04/16/23 0316   GLU 83      K 4.0   *   CO2 26   BUN 9   CREATININE 0.89   CALCIUM 8.4*   , CMP   Recent Labs   Lab 04/16/23 0316      K 4.0   *   CO2 26   GLU 83   BUN 9   CREATININE 0.89   CALCIUM 8.4*   PROT 4.8*   ALBUMIN 2.5*   BILITOT 0.3   ALKPHOS 54   AST 74*   ALT 35   ANIONGAP 10   , CBC   Recent Labs   Lab 04/16/23 0316   WBC 12.06*   HGB 14.1   HCT 43.1      , INR   Lab Results   Component Value Date    INR 1.01 04/15/2023   , Lipid Panel   Lab Results   Component Value Date    CHOL 113 04/16/2023    HDL 50 04/16/2023    LDLCALC 42 04/16/2023    TRIG 105 04/16/2023    CHOLHDL 2.3 04/16/2023   , Troponin No results for input(s): TROPONINI in the last 168 hours. and A1C:   Recent Labs   Lab 04/16/23 0316   HGBA1C 5.4     Radiology: X-Ray: CXR: X-Ray Chest 1 View (CXR)X-Ray Chest AP Portable  Order: [id number]   Status: Final result      Visible to patient: No (inaccessible in Patient Portal)      Next appt: 05/09/2023 at 01:00 PM in Cardiology (MICHELLE Mcclure)      Dx: Chest pain      0 Result Notes  Details    Reading Physician  Reading Date Result Priority   John Domínguez MD  [phone] 4/15/2023 STAT     Narrative & Impression  EXAMINATION:  XR CHEST AP PORTABLE     CLINICAL HISTORY:  Chest pain, unspecified     COMPARISON:  03/14/2019     FINDINGS:  The cardiomediastinal silhouette is within normal limits. The lungs are clear. There is no pneumothorax or pleural effusion.     There is no acute osseous or soft tissue abnormality.     Impression:     No acute cardiopulmonary process.     Place of service: [facility]        Electronically signed by: John Domínguez  Date:                                            04/15/2023  Time:                                           12:09     Compa Hamilton Reggie  Cardiac catheterization  Order# [id number]  Reading physician: Pelon Zamora DO Ordering physician: Pelon Zamora DO Study date: 4/15/23     Patient Information    Name MRN Description   Compa Paredes [id number] 37 y.o. male     Physicians    Panel Physicians Referring Physician Case Authorizing Physician   Pelon Zamora DO (Primary)       Indications    STEMI (ST elevation myocardial infarction) [I21.3 (ICD-10-CM)]     Summary         The 1st Diag lesion was 99% stenosed with 0% stenosis post-intervention.    The ejection fraction was calculated to be 45%.    The left ventricular systolic function was normal.    The left ventricular end diastolic pressure was normal.    The pre-procedure left ventricular end diastolic pressure was 18.    A stent was successfully placed at 15 GEOFF for 17 sec.    A stent was successfully placed.    The estimated blood loss was none.    There was single vessel coronary artery disease.     Severe proximal D1 disease undergoing successful primary PCi with PEARL, 0% residual stenosis.           Pending Diagnostic Studies:     Procedure Component Value Units Date/Time    EXTRA TUBES [[id number]] Collected: 04/15/23 1105    Order Status: Sent Lab Status: In process Updated:  04/15/23 1113    Specimen: Blood, Venous     Narrative:      The following orders were created for panel order EXTRA TUBES.  Procedure                               Abnormality         Status                     ---------                               -----------         ------                     Gold Top Hold[[id number]]                                    In process                 Pink Top Hold[[id number]]                                    In process                   Please view results for these tests on the individual orders.    Echo Saline Bubble? No [[id number]]     Order Status: Sent Lab Status: No result           Final Active Diagnoses:    Diagnosis Date Noted POA    PRINCIPAL PROBLEM:  STEMI (ST elevation myocardial infarction) [I21.3] 04/15/2023 Yes    Ventricular tachycardia [I47.20] 04/16/2023 Yes    Substance abuse [F19.10] 04/15/2023 Unknown    Smoker [F17.200] 04/15/2023 Unknown    ETOH abuse [F10.10] 04/15/2023 Unknown      Problems Resolved During this Admission:     Psychiatric  ETOH abuse  - Social service consulted for information regarding inpatient vs outpatient treatment options for alcohol abuse programs    Cardiac/Vascular  * STEMI (ST elevation myocardial infarction)  - Severe proximal D1 disease undergoing successful primary PCi with PEARL, 0% residual stenosis.  - Discharge home on Coreg 6.25mg BID, Plavix 75mg daily, asa 81mg daily, and atorvastatin 40mg daily  - LDL 42  - Prelim echo with EF 55%, no ace/arb prescribed  - Follow up with MICHELLE Lincoln in 2 weeks    Other  Smoker  - Smoking cessation encouraged        Discharged Condition: good    Disposition: Home or Self Care    Follow Up:   Follow-up Information     MICHELLE Mcclure Follow up in 2 week(s).    Specialties: Family Medicine, Cardiology  Why: Schedule follow up with cardiologyPerla FNP for 2 weeks; HD, STEMI  Please follow up on May 9 at 1:00  Contact information:  [address] [place] [address] Cardiothoracic  Surgery  [facility] [address]  [phone]                       Patient Instructions:      Ambulatory referral/consult to Cardiac Rehab   Standing Status: Future   Referral Priority: Routine Referral Type: Consultation   Referral Reason: Specialty Services Required   Requested Specialty: Cardiac Rehabilitation   Number of Visits Requested: 1     Diet Cardiac     Lifting restrictions   Order Comments: No heavy lifting or strenuous activity for 1 week. May return to work Monday 4/24/2023     Activity as tolerated     Medications:  Reconciled Home Medications:      Medication List      START taking these medications    aspirin 81 MG Chew  Take 1 tablet (81 mg total) by mouth once daily.  Start taking on: April 18, 2023     atorvastatin 40 MG tablet  Commonly known as: LIPITOR  Take 1 tablet (40 mg total) by mouth every evening.     carvediloL 6.25 MG tablet  Commonly known as: COREG  Take 1 tablet (6.25 mg total) by mouth 2 (two) times daily.     clopidogreL 75 mg tablet  Commonly known as: PLAVIX  Take 1 tablet (75 mg total) by mouth once daily.  Start taking on: April 18, 2023        CONTINUE taking these medications    ABILIFY 5 MG Tab  Generic drug: ARIPiprazole  Take 5 mg by mouth.     ZOLOFT 100 MG tablet  Generic drug: sertraline  Take 100 mg by mouth.            Time spent on the discharge of patient: >30 minutes    MICHELLE Mcclure  Cardiology  [facility]

## 2023-04-17 NOTE — HOSPITAL COURSE
Compa Paredes is a 37 y.o. male who presents with approximately 90 mins of chest pain, mid sternal, non-radiating, associated with shortness of breath, nausea and diaphoresis, 10/10 at most severe, radiating into left bicep.  Chest pain improved with initial medical tx in ER, 7/10 when presented to cath lab.  Pt denies previous cardiac history, risk factors positive for male gender, polysubstance abuse.  He reports stopped using cocaine several weeks ago, however has continued to drink at least two pints/day.    4/16/2023: Pt presented to ER with ongoing chest pain, found to have acute anterior STEMI, taken emergently to cath lab for PCI dominant D1, chest pain resolved post procedure.  Plan:  1. STEMI: anterior, post PCI, chest pain free, starting to ambulate, will order echo for AM, home Monday if pain free, no further arrhythmia                                                       2. Polysubstance abuse, will discuss with  possible outpatient treatment programs.                             3. V tach has resolved after revascularization    4/17/2023: Patient seen today, denies chest pain or sob. Right groin stable, no bleeding or hematoma. Patient has no insurance, loaded with plavix 300mg today, start 75mg daily tomorrow.  Social service consulted for cardiac rehab (pt likely will not be able to afford) and for information regarding inpatient vs outpatient alcohol programs to quit drinking as requested by patient and his mother. He is hemodynamically stable and ready for discharge.

## 2023-05-09 ENCOUNTER — OFFICE VISIT (OUTPATIENT)
Dept: CARDIOLOGY | Facility: CLINIC | Age: 38
End: 2023-05-09

## 2023-05-09 VITALS
SYSTOLIC BLOOD PRESSURE: 115 MMHG | WEIGHT: 138 LBS | HEIGHT: 68 IN | RESPIRATION RATE: 18 BRPM | BODY MASS INDEX: 20.92 KG/M2 | HEART RATE: 82 BPM | DIASTOLIC BLOOD PRESSURE: 80 MMHG

## 2023-05-09 DIAGNOSIS — F19.10 POLYSUBSTANCE ABUSE: ICD-10-CM

## 2023-05-09 DIAGNOSIS — F43.10 PTSD (POST-TRAUMATIC STRESS DISORDER): ICD-10-CM

## 2023-05-09 DIAGNOSIS — F17.200 SMOKER: ICD-10-CM

## 2023-05-09 DIAGNOSIS — F10.10 ETOH ABUSE: ICD-10-CM

## 2023-05-09 DIAGNOSIS — I25.10 ATHEROSCLEROSIS OF NATIVE CORONARY ARTERY OF NATIVE HEART WITHOUT ANGINA PECTORIS: Primary | ICD-10-CM

## 2023-05-09 DIAGNOSIS — I47.20 VENTRICULAR TACHYCARDIA: ICD-10-CM

## 2023-05-09 PROCEDURE — 99213 OFFICE O/P EST LOW 20 MIN: CPT | Mod: PBBFAC | Performed by: NURSE PRACTITIONER

## 2023-05-09 PROCEDURE — 99213 OFFICE O/P EST LOW 20 MIN: CPT | Mod: S$PBB,,, | Performed by: NURSE PRACTITIONER

## 2023-05-09 PROCEDURE — 99213 PR OFFICE/OUTPT VISIT, EST, LEVL III, 20-29 MIN: ICD-10-PCS | Mod: S$PBB,,, | Performed by: NURSE PRACTITIONER

## 2023-05-09 RX ORDER — ATORVASTATIN CALCIUM 40 MG/1
40 TABLET, FILM COATED ORAL NIGHTLY
Qty: 90 TABLET | Refills: 3 | Status: SHIPPED | OUTPATIENT
Start: 2023-05-09 | End: 2024-03-12 | Stop reason: CLARIF

## 2023-05-09 RX ORDER — NAPROXEN SODIUM 220 MG/1
81 TABLET, FILM COATED ORAL DAILY
Qty: 90 TABLET | Refills: 3 | Status: SHIPPED | OUTPATIENT
Start: 2023-05-09 | End: 2024-03-12 | Stop reason: CLARIF

## 2023-05-09 RX ORDER — SERTRALINE HYDROCHLORIDE 50 MG/1
50 TABLET, FILM COATED ORAL
COMMUNITY
Start: 2022-11-18 | End: 2024-03-12 | Stop reason: CLARIF

## 2023-05-09 RX ORDER — CARVEDILOL 6.25 MG/1
6.25 TABLET ORAL 2 TIMES DAILY
Qty: 60 TABLET | Refills: 11 | Status: SHIPPED | OUTPATIENT
Start: 2023-05-09 | End: 2024-03-12 | Stop reason: CLARIF

## 2023-05-09 RX ORDER — CLOPIDOGREL BISULFATE 75 MG/1
75 TABLET ORAL DAILY
Qty: 90 TABLET | Refills: 3 | Status: SHIPPED | OUTPATIENT
Start: 2023-05-09 | End: 2024-03-12 | Stop reason: CLARIF

## 2023-05-09 NOTE — PATIENT INSTRUCTIONS
Follow up in 3 months with cardiology, or sooner if needed.  Continue light duty at work for an additional 2 weeks, then may return to full duty.

## 2023-05-09 NOTE — PROGRESS NOTES
PCP: Primary Doctor No    Referring Provider:     Subjective:   Compa Paredes is a 37 y.o. male with hx of PTSD, depression/anxiety (followed by SERAFIN), polysubstance abuse and smoker who presents for 2 week HD following STEMI 4/15/2023 s/p PCI of 1st Diag, echo with LVEF 55%, normal LV diastolic function, and mild-mod MR.    Patient reports he is taking medications as prescribed (asa, plavix statin, and coreg). Denies any chest pain or palpitations. Reports occasional sob with exertion.    He opted not to pursue inpatient rehab after being discharged. States he has not had a drink in over a week. Continues to smoke but down to 8 cig/day.        Fhx:  Shx: current smoker, hx of polysubstance abuse (alcohol/cocaine/benzo/opiate/marijuana)    EKG   Results for orders placed or performed during the hospital encounter of 04/15/23   EKG 12-lead    Collection Time: 04/15/23  6:29 PM    Narrative    Test Reason : I21.3,    Vent. Rate : 075 BPM     Atrial Rate : 000 BPM     P-R Int : 170 ms          QRS Dur : 082 ms      QT Int : 414 ms       P-R-T Axes : 070 064 078 degrees     QTc Int : 440 ms    Sinus rhythm  Anterior T wave abnormality is nonspecific  Borderline ECG    Confirmed by Pelon Zamora DO ([id number]) on 4/17/2023 5:44:19 AM    Referred By: [email or web address]   SELF           Confirmed By:Pelon Zamora DO     ECHO Results for orders placed during the hospital encounter of 04/15/23    Echo    Interpretation Summary  · The left ventricle is normal in size with normal systolic function.  · The estimated ejection fraction is 55%.  · Normal left ventricular diastolic function.  · Atrial fibrillation not observed.  · Normal right ventricular size.  · Mild-to-moderate mitral regurgitation.  · Normal central venous pressure (3 mmHg).  · The estimated PA systolic pressure is 17 mmHg.    [facility] Results for orders placed during the hospital encounter of 04/15/23    Cardiac catheterization    Conclusion    The 1st Diag lesion was 99%  stenosed with 0% stenosis post-intervention.    The ejection fraction was calculated to be 45%.    The left ventricular systolic function was normal.    The left ventricular end diastolic pressure was normal.    The pre-procedure left ventricular end diastolic pressure was 18.    A stent was successfully placed at 15 GEOFF for 17 sec.    A stent was successfully placed.    The estimated blood loss was none.    There was single vessel coronary artery disease.    Severe proximal D1 disease undergoing successful primary PCi with PEARL, 0% residual stenosis.        Lab Results   Component Value Date     04/16/2023    K 4.0 04/16/2023     (H) 04/16/2023    CO2 26 04/16/2023    BUN 9 04/16/2023    CREATININE 0.89 04/16/2023    CALCIUM 8.4 (L) 04/16/2023    ANIONGAP 10 04/16/2023    ESTGFRAFRICA 114 03/05/2021    EGFRNONAA 94 03/05/2021       Lab Results   Component Value Date    CHOL 113 04/16/2023     Lab Results   Component Value Date    HDL 50 04/16/2023     Lab Results   Component Value Date    LDLCALC 42 04/16/2023     Lab Results   Component Value Date    TRIG 105 04/16/2023     Lab Results   Component Value Date    CHOLHDL 2.3 04/16/2023       Lab Results   Component Value Date    WBC 12.06 (H) 04/16/2023    HGB 14.1 04/16/2023    HCT 43.1 04/16/2023    MCV 90.7 04/16/2023     04/16/2023           Current Outpatient Medications:     ZOLOFT 100 mg tablet, Take 100 mg by mouth., Disp: , Rfl:     ABILIFY 5 mg Tab, Take 5 mg by mouth., Disp: , Rfl:     aspirin 81 MG Chew, Take 1 tablet (81 mg total) by mouth once daily., Disp: 90 tablet, Rfl: 3    atorvastatin (LIPITOR) 40 MG tablet, Take 1 tablet (40 mg total) by mouth every evening., Disp: 90 tablet, Rfl: 3    carvediloL (COREG) 6.25 MG tablet, Take 1 tablet (6.25 mg total) by mouth 2 (two) times daily., Disp: 60 tablet, Rfl: 11    clopidogreL (PLAVIX) 75 mg tablet, Take 1 tablet (75 mg total) by mouth once daily., Disp: 90 tablet, Rfl: 3    sertraline  "(ZOLOFT) 50 MG tablet, Take 50 mg by mouth., Disp: , Rfl:   No current facility-administered medications for this visit.    Facility-Administered Medications Ordered in Other Visits:     iopamidoL (ISOVUE-370) injection, , , PRN, Pelon Zamora, DO, 100 mL at 04/15/23 1230    Review of Systems   Constitutional:  Negative for fever and weight loss.   Eyes: Negative.    Respiratory:  Positive for shortness of breath (with exertion).    Cardiovascular:  Negative for chest pain, orthopnea and leg swelling.   Gastrointestinal:  Negative for blood in stool, melena, nausea and vomiting.   Genitourinary:  Negative for hematuria.   Psychiatric/Behavioral:  Positive for depression, substance abuse and suicidal ideas (no plans for taking his life). The patient is nervous/anxious and has insomnia.         Followed by SERAFIN, PTSD, suicidal, depression       Objective:   /80   Pulse 82   Resp 18   Ht 5' 8" (1.727 m)   Wt 62.6 kg (138 lb)   BMI 20.98 kg/m²     Physical Exam  Vitals reviewed.   Constitutional:       General: He is not in acute distress.  Eyes:      General: No scleral icterus.     Pupils: Pupils are equal, round, and reactive to light.   Cardiovascular:      Rate and Rhythm: Normal rate and regular rhythm.      Pulses: Normal pulses.      Heart sounds: Normal heart sounds.   Pulmonary:      Effort: Pulmonary effort is normal.      Breath sounds: Normal breath sounds. No wheezing, rhonchi or rales.   Abdominal:      General: Bowel sounds are normal.      Palpations: Abdomen is soft.   Musculoskeletal:      Right lower leg: No edema.      Left lower leg: No edema.   Skin:     General: Skin is warm and dry.      Coloration: Skin is not jaundiced or pale.   Neurological:      Mental Status: He is alert and oriented to person, place, and time.   Psychiatric:         Mood and Affect: Mood normal.         Behavior: Behavior normal.         Assessment:     1. Atherosclerosis of native coronary artery of native " heart without angina pectoris        2. ETOH abuse        3. Polysubstance abuse        4. Ventricular tachycardia        5. Smoker        6. PTSD (post-traumatic stress disorder)              Plan:   ETOH abuse  Declined inpatient rehab after discharging  Reports he has not had a drink in over 1 week    Polysubstance abuse  UDS 4/15/2023 positive for cocaine, benzos, opiates, and marijuana.  Patient reports he is only vaping CBD now for his anxiety and severe insomnia      Ventricular tachycardia  STEMI 4/15/2023; VT resolved prior to discharge    Atherosclerosis of native coronary artery of native heart without angina pectoris  STEMI  [facility] 4/15/2023; s/p PCI of 1st Diag  Echo with LVEF 55%, mild-mod MR    Continue ASA 81 daily, Plavix 75 daily, Coreg 6.25 BID, and atorvastain 40 daily  Additional 2 weeks light duty at work, then return to full duty    Smoker  Encouraged continued smoking cessation  Patient down to 8/cig/day    PTSD (post-traumatic stress disorder)  Severe depression/anxiety/insomnia  Followed by Johnna    Follow up with me in 3 months, or sooner if needed

## 2023-05-11 PROBLEM — F43.10 PTSD (POST-TRAUMATIC STRESS DISORDER): Status: ACTIVE | Noted: 2023-05-11

## 2023-05-11 PROBLEM — I25.110 ATHEROSCLEROSIS OF NATIVE CORONARY ARTERY OF NATIVE HEART WITH UNSTABLE ANGINA PECTORIS: Status: ACTIVE | Noted: 2023-04-15

## 2023-05-11 PROBLEM — I25.10 ATHEROSCLEROSIS OF NATIVE CORONARY ARTERY OF NATIVE HEART WITHOUT ANGINA PECTORIS: Status: ACTIVE | Noted: 2023-04-15

## 2023-05-11 NOTE — ASSESSMENT & PLAN NOTE
STEMI  [facility] 4/15/2023; s/p PCI of 1st Diag  Echo with LVEF 55%, mild-mod MR    Continue ASA 81 daily, Plavix 75 daily, Coreg 6.25 BID, and atorvastain 40 daily  Additional 2 weeks light duty at work, then return to full duty

## 2023-05-11 NOTE — ASSESSMENT & PLAN NOTE
UDS 4/15/2023 positive for cocaine, benzos, opiates, and marijuana.  Patient reports he is only vaping CBD now for his anxiety and severe insomnia

## 2024-03-12 ENCOUNTER — HOSPITAL ENCOUNTER (EMERGENCY)
Facility: HOSPITAL | Age: 39
Discharge: HOME | End: 2024-03-12

## 2024-03-12 VITALS
RESPIRATION RATE: 20 BRPM | SYSTOLIC BLOOD PRESSURE: 134 MMHG | HEART RATE: 108 BPM | TEMPERATURE: 98 F | OXYGEN SATURATION: 99 % | HEIGHT: 68 IN | BODY MASS INDEX: 21.98 KG/M2 | WEIGHT: 145 LBS | DIASTOLIC BLOOD PRESSURE: 89 MMHG

## 2024-03-12 DIAGNOSIS — J06.9 VIRAL URI WITH COUGH: Primary | ICD-10-CM

## 2024-03-12 DIAGNOSIS — J30.2 SEASONAL ALLERGIES: ICD-10-CM

## 2024-03-12 PROCEDURE — 99283 EMERGENCY DEPT VISIT LOW MDM: CPT

## 2024-03-12 PROCEDURE — 99284 EMERGENCY DEPT VISIT MOD MDM: CPT | Mod: ,,, | Performed by: NURSE PRACTITIONER

## 2024-03-12 RX ORDER — BENZONATATE 100 MG/1
100 CAPSULE ORAL 3 TIMES DAILY PRN
Qty: 20 CAPSULE | Refills: 0 | Status: SHIPPED | OUTPATIENT
Start: 2024-03-12 | End: 2024-03-22

## 2024-03-12 RX ORDER — FEXOFENADINE HCL AND PSEUDOEPHEDRINE HCI 60; 120 MG/1; MG/1
1 TABLET, EXTENDED RELEASE ORAL 2 TIMES DAILY
Qty: 20 TABLET | Refills: 0 | Status: SHIPPED | OUTPATIENT
Start: 2024-03-12 | End: 2024-03-22

## 2024-03-12 NOTE — DISCHARGE INSTRUCTIONS
Take medications as prescribed.  Follow up with primary care provider in 2-3 days if symptoms do not improve with treatment.  Return to the ER with new or worsening symptoms.

## 2024-03-12 NOTE — ED PROVIDER NOTES
Encounter Date: 3/12/2024       History     Chief Complaint   Patient presents with    Cough    Sore Throat    Nasal Congestion     Patient presents to ER with complaint of generalized body aches, chills, and cough.  Patient reports decreased appetite, denies nausea, vomiting, and diarrhea. Reports sore throat and headache.  Cough is productive of yellow mucus.  Patient denies fever.  Symptoms have been going on for 2-3 days.    The history is provided by the patient. No  was used.     Review of patient's allergies indicates:  No Known Allergies  Past Medical History:   Diagnosis Date    Heart attack     Hypertension      Past Surgical History:   Procedure Laterality Date    ANGIOGRAM, CORONARY, WITH LEFT HEART CATHETERIZATION  4/15/2023    Procedure: Angiogram, Coronary, with Left Heart Cath;  Surgeon: Pelon Zamora DO;  Location: [facility] CATH LAB;  Service: Cardiology;;    PERCUTANEOUS CORONARY INTERVENTION, ARTERY N/A 4/15/2023    Procedure: Percutaneous coronary intervention;  Surgeon: Pelon Zamora DO;  Location: [facility] CATH LAB;  Service: Cardiology;  Laterality: N/A;     History reviewed. No pertinent family history.  Social History     Tobacco Use    Smoking status: Every Day     Current packs/day: 0.50     Types: Cigarettes    Smokeless tobacco: Never   Substance Use Topics    Alcohol use: Yes    Drug use: Not Currently     Types: Cocaine     Review of Systems   Constitutional:  Positive for activity change and fatigue.   HENT:  Positive for congestion, postnasal drip and sore throat.    Respiratory:  Positive for cough.    Neurological:  Positive for headaches.   All other systems reviewed and are negative.      Physical Exam     Initial Vitals [03/12/24 1204]   BP Pulse Resp Temp SpO2   134/89 108 20 98 °F (36.7 °C) 99 %      MAP       --         Physical Exam    Nursing note and vitals reviewed.  Constitutional: Vital signs are normal. He appears well-developed and  well-nourished. He is cooperative.   HENT:   Head: Normocephalic.   Right Ear: Hearing, tympanic membrane, external ear and ear canal normal.   Left Ear: Hearing, tympanic membrane, external ear and ear canal normal.   Nose: Nose normal.   Mouth/Throat: Mucous membranes are normal. Dental caries present. Oropharyngeal exudate and posterior oropharyngeal erythema present.   Eyes: EOM are normal.   Neck:   Normal range of motion.  Cardiovascular:  Normal rate, normal heart sounds and intact distal pulses.           Pulmonary/Chest: Breath sounds normal.   Abdominal: Abdomen is soft. Bowel sounds are normal.   Musculoskeletal:         General: Normal range of motion.      Cervical back: Normal range of motion.     Neurological: He is alert and oriented to person, place, and time. He has normal strength. GCS score is 15. GCS eye subscore is 4. GCS verbal subscore is 5. GCS motor subscore is 6.   Skin: Skin is warm and dry. Capillary refill takes less than 2 seconds.   Psychiatric: He has a normal mood and affect.         Medical Screening Exam   See Full Note    ED Course   Procedures  Labs Reviewed - No data to display       Imaging Results    None          Medications - No data to display  Medical Decision Making  Patient presents to ER with complaint of generalized body aches, chills, and cough.  Patient reports decreased appetite, denies nausea, vomiting, and diarrhea. Reports sore throat and headache.  Cough is productive of yellow mucus.  Patient denies fever.  Symptoms have been going on for 2-3 days.      Amount and/or Complexity of Data Reviewed  Discussion of management or test interpretation with external provider(s): Patient was discharged home with diagnosis of viral upper respiratory illness with cough and seasonal allergies.  He was given a prescription for Allegra D and Ildefonso Gutierrez.  He was instructed to take medication as prescribed follow up with the primary care provider in 2 days if symptoms do  not improve with treatment.  Return to the ER with new or worsening symptoms.    Risk  OTC drugs.  Prescription drug management.                                      Clinical Impression:   Final diagnoses:  [J06.9] Viral URI with cough (Primary)  [J30.2] Seasonal allergies        ED Disposition Condition    Discharge Stable          ED Prescriptions       Medication Sig Dispense Start Date End Date Auth. Provider    fexofenadine-pseudoephedrine  mg (ALLEGRA-D)  mg per tablet Take 1 tablet by mouth 2 (two) times daily. for 10 days 20 tablet 3/12/2024 3/22/2024 Tiffany Ornelas FNP    benzonatate (TESSALON) 100 MG capsule Take 1 capsule (100 mg total) by mouth 3 (three) times daily as needed for Cough. 20 capsule 3/12/2024 3/22/2024 Tiffany Ornelas FNP          Follow-up Information       Follow up With Specialties Details Why Contact Info    [facility]  Schedule an appointment as soon as possible for a visit in 2 days If symptoms worsen              Tiffany Ornelas FNP  03/12/24 3979

## 2024-03-12 NOTE — Clinical Note
"Compa Paredes (Andrae) was seen and treated in our emergency department on 3/12/2024.  He may return to work on 03/14/2024.       If you have any questions or concerns, please don't hesitate to call.      Tiffany Ornelas, FNP"

## 2024-04-01 ENCOUNTER — HOSPITAL ENCOUNTER (EMERGENCY)
Facility: HOSPITAL | Age: 39
Discharge: HOME | End: 2024-04-01

## 2024-04-01 VITALS
HEART RATE: 99 BPM | BODY MASS INDEX: 21.22 KG/M2 | SYSTOLIC BLOOD PRESSURE: 105 MMHG | WEIGHT: 140 LBS | RESPIRATION RATE: 20 BRPM | OXYGEN SATURATION: 96 % | HEIGHT: 68 IN | TEMPERATURE: 98 F | DIASTOLIC BLOOD PRESSURE: 69 MMHG

## 2024-04-01 DIAGNOSIS — L02.91 ABSCESS: Primary | ICD-10-CM

## 2024-04-01 PROCEDURE — 10060 I&D ABSCESS SIMPLE/SINGLE: CPT

## 2024-04-01 PROCEDURE — 25000003 PHARM REV CODE 250: Performed by: NURSE PRACTITIONER

## 2024-04-01 PROCEDURE — 10060 I&D ABSCESS SIMPLE/SINGLE: CPT | Mod: ,,, | Performed by: NURSE PRACTITIONER

## 2024-04-01 PROCEDURE — 99284 EMERGENCY DEPT VISIT MOD MDM: CPT | Mod: 25,,, | Performed by: NURSE PRACTITIONER

## 2024-04-01 PROCEDURE — 99283 EMERGENCY DEPT VISIT LOW MDM: CPT | Mod: 25

## 2024-04-01 RX ORDER — LIDOCAINE HYDROCHLORIDE 10 MG/ML
10 INJECTION INFILTRATION; PERINEURAL
Status: COMPLETED | OUTPATIENT
Start: 2024-04-01 | End: 2024-04-01

## 2024-04-01 RX ORDER — SULFAMETHOXAZOLE AND TRIMETHOPRIM 800; 160 MG/1; MG/1
1 TABLET ORAL 2 TIMES DAILY
Qty: 14 TABLET | Refills: 0 | Status: SHIPPED | OUTPATIENT
Start: 2024-04-01 | End: 2024-04-08

## 2024-04-01 RX ADMIN — LIDOCAINE HYDROCHLORIDE 10 ML: 10 INJECTION, SOLUTION INFILTRATION; PERINEURAL at 09:04

## 2024-04-01 NOTE — ED PROVIDER NOTES
Encounter Date: 4/1/2024       History     Chief Complaint   Patient presents with    Insect Bite     38-year-old male presents to the emergency department to be evaluated for a tender swollen area to his left buttock.  Denies any fever or chills.    The history is provided by the patient.     Review of patient's allergies indicates:  No Known Allergies  Past Medical History:   Diagnosis Date    Heart attack     Hypertension      Past Surgical History:   Procedure Laterality Date    ANGIOGRAM, CORONARY, WITH LEFT HEART CATHETERIZATION  4/15/2023    Procedure: Angiogram, Coronary, with Left Heart Cath;  Surgeon: Pelon Zamora DO;  Location: [facility] CATH LAB;  Service: Cardiology;;    PERCUTANEOUS CORONARY INTERVENTION, ARTERY N/A 4/15/2023    Procedure: Percutaneous coronary intervention;  Surgeon: Pelon Zamora DO;  Location: [facility] CATH LAB;  Service: Cardiology;  Laterality: N/A;     History reviewed. No pertinent family history.  Social History     Tobacco Use    Smoking status: Every Day     Current packs/day: 0.50     Types: Cigarettes    Smokeless tobacco: Never   Substance Use Topics    Alcohol use: Yes    Drug use: Not Currently     Types: Cocaine     Review of Systems   Constitutional:  Negative for chills and fever.   All other systems reviewed and are negative.      Physical Exam     Initial Vitals [04/01/24 0922]   BP Pulse Resp Temp SpO2   105/69 99 20 98.1 °F (36.7 °C) 96 %      MAP       --         Physical Exam    Vitals reviewed.  Constitutional: He appears well-developed and well-nourished.   Neck: Neck supple.   Cardiovascular:  Normal rate and regular rhythm.           Pulmonary/Chest: Breath sounds normal.   Abdominal: Abdomen is soft. Bowel sounds are normal. There is no abdominal tenderness.   Genitourinary:    Genitourinary Comments: Chaperone:  Jennifer Wong RN     Musculoskeletal:         General: Normal range of motion.      Cervical back: Neck supple.     Neurological: He is  alert. GCS score is 15. GCS eye subscore is 4. GCS verbal subscore is 5. GCS motor subscore is 6.   Skin: Skin is warm and dry. Capillary refill takes less than 2 seconds.   3 cm tender fluctuant area to the medial aspect of the left buttock, does not extend into the rectum, with minimal amount purulent drainage.   Psychiatric: He has a normal mood and affect.         Medical Screening Exam   See Full Note    ED Course   I & D - Incision and Drainage    Date/Time: 4/1/2024 10:00 AM  Location procedure was performed: [facility] EMERGENCY DEPARTMENT    Performed by: aSrahi Colon FNP  Authorized by: Sarahi Colon FNP  Pre-operative diagnosis: Abscess  Post-operative diagnosis: Abscess  Consent Done: Yes  Consent: Written consent obtained.  Consent given by: patient  Patient understanding: patient states understanding of the procedure being performed  Patient consent: the patient's understanding of the procedure matches consent given  Procedure consent: procedure consent matches procedure scheduled  Patient identity confirmed: name  Type: abscess  Anesthesia: local infiltration    Anesthesia:  Local Anesthetic: lidocaine 1% without epinephrine  Anesthetic total: 3 mL    Patient sedated: no  Scalpel size: 11  Incision type: single straight  Incision depth: dermal  Complexity: simple  Drainage: pus  Drainage amount: moderate  Wound treatment: incision and drainage  Packing material: 1/4 in iodoform gauze  Complications: No  Estimated blood loss (mL): 2  Specimens: No  Implants: No  Patient tolerance: Patient tolerated the procedure well with no immediate complications    Incision depth: dermal        Labs Reviewed - No data to display       Imaging Results    None          Medications   LIDOcaine HCL 10 mg/ml (1%) injection 10 mL (has no administration in time range)     Medical Decision Making  38-year-old male presents to the emergency department to be evaluated for a tender swollen area to his left  buttock.  Denies any fever or chills.  Incision and drainage performed  Diagnosis: Abscess  Prescribed Bactrim    Risk  Prescription drug management.                                      Clinical Impression:   Final diagnoses:  [L02.91] Abscess (Primary)        ED Disposition Condition    Discharge Stable          ED Prescriptions       Medication Sig Dispense Start Date End Date Auth. Provider    sulfamethoxazole-trimethoprim 800-160mg (BACTRIM DS) 800-160 mg Tab Take 1 tablet by mouth 2 (two) times daily. for 7 days 14 tablet 4/1/2024 4/8/2024 Sarahi Colon FNP          Follow-up Information    None          Sarahi Colon FNP  04/01/24 1000

## 2024-04-01 NOTE — Clinical Note
"Compa Paredes (Andrae) was seen and treated in our emergency department on 4/1/2024.  He may return to work on 04/01/2024.       If you have any questions or concerns, please don't hesitate to call.      Sarahi Colon, ABDOULAYEP"

## 2024-04-01 NOTE — ED TRIAGE NOTES
"Pt presents to ed via pov, c/o an insect bite on his "back side". Pt states it is getting bigger, swollen, and painful./   "

## 2024-04-08 ENCOUNTER — HOSPITAL ENCOUNTER (EMERGENCY)
Facility: HOSPITAL | Age: 39
Discharge: HOME | End: 2024-04-08

## 2024-04-08 VITALS
RESPIRATION RATE: 18 BRPM | TEMPERATURE: 99 F | HEIGHT: 68 IN | BODY MASS INDEX: 21.22 KG/M2 | HEART RATE: 94 BPM | WEIGHT: 140 LBS | SYSTOLIC BLOOD PRESSURE: 127 MMHG | DIASTOLIC BLOOD PRESSURE: 88 MMHG | OXYGEN SATURATION: 98 %

## 2024-04-08 DIAGNOSIS — Z51.89 VISIT FOR WOUND CHECK: Primary | ICD-10-CM

## 2024-04-08 PROCEDURE — 99024 POSTOP FOLLOW-UP VISIT: CPT | Mod: ,,, | Performed by: NURSE PRACTITIONER

## 2024-04-08 PROCEDURE — 99281 EMR DPT VST MAYX REQ PHY/QHP: CPT

## 2024-04-08 NOTE — DISCHARGE INSTRUCTIONS
Return for any fever, chills, redness, swelling, drainage or for any other new or worrisome symptoms

## 2024-04-08 NOTE — ED TRIAGE NOTES
Patient presents to the ED with c/o follow up from a spider bite. Patient states he came to get his packing out

## 2024-04-08 NOTE — Clinical Note
"Compa Paredes (Andrae) was seen and treated in our emergency department on 4/8/2024.  He may return to work on 04/09/2024.       If you have any questions or concerns, please don't hesitate to call.      Sarahi Colon, ABDOULAYEP"

## 2024-04-08 NOTE — ED PROVIDER NOTES
Encounter Date: 4/8/2024       History     Chief Complaint   Patient presents with    Wound Check     38-year-old male presents to the emergency department for wound recheck.  He had incision and drainage of an abscess 7 days ago.  He reports area has been doing well.  He was not able to come for his recheck and told today.    The history is provided by the patient.   Wound Check   He was treated in the ED 5 to 10 days ago. There has been no drainage from the wound. There is no redness present. There is no swelling present. There is no pain present.     Review of patient's allergies indicates:  No Known Allergies  Past Medical History:   Diagnosis Date    Heart attack     Hypertension      Past Surgical History:   Procedure Laterality Date    ANGIOGRAM, CORONARY, WITH LEFT HEART CATHETERIZATION  4/15/2023    Procedure: Angiogram, Coronary, with Left Heart Cath;  Surgeon: Pelon Zamora DO;  Location: [facility] CATH LAB;  Service: Cardiology;;    PERCUTANEOUS CORONARY INTERVENTION, ARTERY N/A 4/15/2023    Procedure: Percutaneous coronary intervention;  Surgeon: Pelon Zamora DO;  Location: [facility] CATH LAB;  Service: Cardiology;  Laterality: N/A;     No family history on file.  Social History     Tobacco Use    Smoking status: Every Day     Current packs/day: 0.50     Types: Cigarettes    Smokeless tobacco: Never   Substance Use Topics    Alcohol use: Yes    Drug use: Not Currently     Types: Cocaine     Review of Systems   Constitutional:  Negative for chills, fatigue and fever.   All other systems reviewed and are negative.      Physical Exam     Initial Vitals [04/08/24 1029]   BP Pulse Resp Temp SpO2   127/88 94 18 98.6 °F (37 °C) 98 %      MAP       --         Physical Exam    Vitals reviewed.  Constitutional: He appears well-developed and well-nourished.   Neck: Neck supple.   Musculoskeletal:      Cervical back: Neck supple.     Neurological: He is alert. GCS eye subscore is 4. GCS verbal subscore is 5.  GCS motor subscore is 6.   Skin: Skin is warm and dry.   Incision to the medial aspect of the left buttock healing well, dry and intact, no packing   Psychiatric: He has a normal mood and affect.         Medical Screening Exam   See Full Note    ED Course   Procedures  Labs Reviewed - No data to display       Imaging Results    None          Medications - No data to display  Medical Decision Making  38-year-old male presents to the emergency department for wound recheck.  He had incision and drainage of an abscess 7 days ago.  He reports area has been doing well.  He was not able to come for his recheck and told today.  Diagnosis: Wound check                                      Clinical Impression:   Final diagnoses:  [Z51.89] Visit for wound check (Primary)        ED Disposition Condition    Discharge Stable          ED Prescriptions    None       Follow-up Information    None          Sarahi Colon, MICHELLE  04/08/24 1038

## 2024-05-21 ENCOUNTER — HOSPITAL ENCOUNTER (EMERGENCY)
Facility: HOSPITAL | Age: 39
Discharge: HOME | End: 2024-05-21

## 2024-05-21 VITALS
SYSTOLIC BLOOD PRESSURE: 120 MMHG | RESPIRATION RATE: 20 BRPM | BODY MASS INDEX: 19.14 KG/M2 | TEMPERATURE: 99 F | DIASTOLIC BLOOD PRESSURE: 78 MMHG | HEIGHT: 68 IN | WEIGHT: 126.31 LBS | OXYGEN SATURATION: 98 % | HEART RATE: 106 BPM
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
## 2024-05-21 DIAGNOSIS — S30.860A INSECT BITE OF LOWER BACK, INITIAL ENCOUNTER: Primary | ICD-10-CM

## 2024-05-21 PROCEDURE — 99284 EMERGENCY DEPT VISIT MOD MDM: CPT | Mod: 25

## 2024-05-21 PROCEDURE — 96372 THER/PROPH/DIAG INJ SC/IM: CPT | Performed by: NURSE PRACTITIONER

## 2024-05-21 PROCEDURE — 63600175 PHARM REV CODE 636 W HCPCS: Performed by: NURSE PRACTITIONER

## 2024-05-21 RX ORDER — CEFTRIAXONE 1 G/1
1 INJECTION, POWDER, FOR SOLUTION INTRAMUSCULAR; INTRAVENOUS
Status: COMPLETED | OUTPATIENT
Start: 2024-05-21 | End: 2024-05-21

## 2024-05-21 RX ORDER — SULFAMETHOXAZOLE AND TRIMETHOPRIM 800; 160 MG/1; MG/1
1 TABLET ORAL 2 TIMES DAILY
Qty: 14 TABLET | Refills: 0 | Status: SHIPPED | OUTPATIENT
Start: 2024-05-21 | End: 2024-05-28

## 2024-05-21 RX ADMIN — CEFTRIAXONE SODIUM 1 G: 1 INJECTION, POWDER, FOR SOLUTION INTRAMUSCULAR; INTRAVENOUS at 11:05

## 2024-05-21 NOTE — Clinical Note
"Compa Paredes (Andrae) was seen and treated in our emergency department on 5/21/2024.  He may return to work on 05/22/2024.       If you have any questions or concerns, please don't hesitate to call.      Juani Kincaid, ABDOULAYEP"

## 2024-05-22 NOTE — ED PROVIDER NOTES
"Encounter Date: 5/21/2024       History     Chief Complaint   Patient presents with    Insect Bite     Pt got stung at 0800 this morning. Pt's spouse squeezed it and liquid came out. Swollen spot on left buttocks      38 year old male presents to ED with complaint of insect bite. Patient reports he was at work and felt something bite him on his lower back/upper buttock on right side. Patient reports when he got off, his spouse squeezed the area and reported drainage looked like "clear snot". Patient reports tenderness to the area. Denies fever, chills. Patient reports taking cousin's PCN on this afternoon x2 doses.     The history is provided by the patient.     Review of patient's allergies indicates:  No Known Allergies  Past Medical History:   Diagnosis Date    Heart attack     Hypertension      Past Surgical History:   Procedure Laterality Date    ANGIOGRAM, CORONARY, WITH LEFT HEART CATHETERIZATION  4/15/2023    Procedure: Angiogram, Coronary, with Left Heart Cath;  Surgeon: Pelon Zamora DO;  Location: [facility] CATH LAB;  Service: Cardiology;;    PERCUTANEOUS CORONARY INTERVENTION, ARTERY N/A 4/15/2023    Procedure: Percutaneous coronary intervention;  Surgeon: Pelon Zamora DO;  Location: [facility] CATH LAB;  Service: Cardiology;  Laterality: N/A;     No family history on file.  Social History     Tobacco Use    Smoking status: Every Day     Current packs/day: 0.50     Types: Cigarettes    Smokeless tobacco: Never   Substance Use Topics    Alcohol use: Yes    Drug use: Not Currently     Types: Cocaine     Review of Systems   Constitutional:  Negative for chills and fever.   HENT:  Negative for sinus pressure and sinus pain.    Eyes:  Negative for photophobia and visual disturbance.   Respiratory:  Negative for cough and shortness of breath.    Cardiovascular:  Negative for chest pain and palpitations.   Gastrointestinal:  Negative for nausea and vomiting.   Genitourinary:  Negative for dysuria and " urgency.   Musculoskeletal:  Negative for arthralgias and back pain.   Skin:  Positive for wound. Negative for color change.   Neurological:  Negative for dizziness and weakness.   Hematological:  Negative for adenopathy. Does not bruise/bleed easily.   Psychiatric/Behavioral:  Negative for agitation and confusion.    All other systems reviewed and are negative.      Physical Exam     Initial Vitals [05/21/24 2238]   BP Pulse Resp Temp SpO2   120/78 106 20 99 °F (37.2 °C) 98 %      MAP       --         Physical Exam    Nursing note and vitals reviewed.  Constitutional: He appears well-developed.   HENT:   Head: Normocephalic and atraumatic.   Eyes: EOM are normal. Pupils are equal, round, and reactive to light.   Neck: Neck supple.   Normal range of motion.  Cardiovascular:  Normal rate and regular rhythm.           No murmur heard.  Pulmonary/Chest: He has no wheezes. He has no rhonchi.   Abdominal: Abdomen is soft. He exhibits no distension. There is no abdominal tenderness.   Musculoskeletal:         General: No tenderness or edema.      Cervical back: Normal range of motion and neck supple.     Lymphadenopathy:     He has no cervical adenopathy.   Neurological: He is alert and oriented to person, place, and time. No cranial nerve deficit or sensory deficit.   Skin: Skin is warm and dry. Capillary refill takes less than 2 seconds.        Round firm area without redness, warmth, fluctuance.    Psychiatric: He has a normal mood and affect. Thought content normal.         Medical Screening Exam   See Full Note    ED Course   Procedures  Labs Reviewed - No data to display       Imaging Results    None          Medications   cefTRIAXone injection 1 g (1 g Intramuscular Given 5/21/24 6126)     Medical Decision Making  38 year old male presents to ED with complaint of insect bite. Patient reports he was at work and felt something bite him on his lower back/upper buttock on right side. Patient reports when he got off,  "his spouse squeezed the area and reported drainage looked like "clear snot". Patient reports tenderness to the area. Denies fever, chills.     IM Rocephin administered. Prescription provided with return instructions discussed    Risk  Prescription drug management.                                      Clinical Impression:   Final diagnoses:  [S30.860A, W57.XXXA] Insect bite of lower back, initial encounter (Primary)        ED Disposition Condition    Discharge Stable          ED Prescriptions       Medication Sig Dispense Start Date End Date Auth. Provider    sulfamethoxazole-trimethoprim 800-160mg (BACTRIM DS) 800-160 mg Tab Take 1 tablet by mouth 2 (two) times daily. for 7 days 14 tablet 5/21/2024 5/28/2024 Juani Kincaid, MICHELLE          Follow-up Information    None          Juani Kincaid, MICHELLE  05/21/24 2412    "

## 2024-05-22 NOTE — ED TRIAGE NOTES
Pt states he took 2 penicillin pills today and has been putting triple antibotic ointment on the area.

## 2024-05-22 NOTE — DISCHARGE INSTRUCTIONS
Follow up with PCP in 48-72 hours. Return to ED if any new or worsening of symptoms occur. Avoid squeezing area. Return if area becomes larger with area of bogginess as discussed

## 2024-08-25 ENCOUNTER — HOSPITAL ENCOUNTER (EMERGENCY)
Facility: HOSPITAL | Age: 39
Discharge: HOME | End: 2024-08-25

## 2024-08-25 VITALS
HEIGHT: 68 IN | OXYGEN SATURATION: 97 % | BODY MASS INDEX: 18.04 KG/M2 | TEMPERATURE: 100 F | WEIGHT: 119 LBS | RESPIRATION RATE: 18 BRPM | HEART RATE: 105 BPM | DIASTOLIC BLOOD PRESSURE: 75 MMHG | SYSTOLIC BLOOD PRESSURE: 108 MMHG

## 2024-08-25 DIAGNOSIS — U07.1 COVID-19 VIRUS DETECTED: ICD-10-CM

## 2024-08-25 DIAGNOSIS — U07.1 COVID-19: Primary | ICD-10-CM

## 2024-08-25 LAB
INFLUENZA A MOLECULAR (OHS): NEGATIVE
INFLUENZA B MOLECULAR (OHS): NEGATIVE
SARS-COV-2 RDRP RESP QL NAA+PROBE: POSITIVE

## 2024-08-25 PROCEDURE — 99282 EMERGENCY DEPT VISIT SF MDM: CPT

## 2024-08-25 PROCEDURE — 87635 SARS-COV-2 COVID-19 AMP PRB: CPT | Performed by: NURSE PRACTITIONER

## 2024-08-25 PROCEDURE — 87502 INFLUENZA DNA AMP PROBE: CPT | Performed by: NURSE PRACTITIONER

## 2024-08-25 NOTE — Clinical Note
"Compa Praedes (Andrae) was seen and treated in our emergency department on 8/25/2024.  He may return to work on 08/29/2024.       If you have any questions or concerns, please don't hesitate to call.      Ginny Saini, ABDOULAYEP"

## 2024-08-25 NOTE — DISCHARGE INSTRUCTIONS
Drink plenty of fluids, tylenol if needed. Quarantine until fever free for 24 hours without fever reducers.

## 2024-08-25 NOTE — ED PROVIDER NOTES
Encounter Date: 8/25/2024       History     Chief Complaint   Patient presents with    Generalized Body Aches    Fever    Chills     Patient presents to the ED with c/o body aches, fever, chills, cough, and congestion that started yesterday     Pt presents with flu-like symptoms since yesterday.         Review of patient's allergies indicates:  No Known Allergies  Past Medical History:   Diagnosis Date    Heart attack     Hypertension      Past Surgical History:   Procedure Laterality Date    ANGIOGRAM, CORONARY, WITH LEFT HEART CATHETERIZATION  4/15/2023    Procedure: Angiogram, Coronary, with Left Heart Cath;  Surgeon: Pelon Zamora DO;  Location: [facility] CATH LAB;  Service: Cardiology;;    PERCUTANEOUS CORONARY INTERVENTION, ARTERY N/A 4/15/2023    Procedure: Percutaneous coronary intervention;  Surgeon: Pelon Zamora DO;  Location: [facility] CATH LAB;  Service: Cardiology;  Laterality: N/A;     No family history on file.  Social History     Tobacco Use    Smoking status: Every Day     Current packs/day: 0.50     Types: Cigarettes    Smokeless tobacco: Never   Substance Use Topics    Alcohol use: Yes    Drug use: Not Currently     Types: Cocaine     Review of Systems   Constitutional:  Positive for chills and fever.   HENT:  Positive for rhinorrhea. Negative for sore throat.    Respiratory:  Negative for shortness of breath.    Cardiovascular:  Negative for chest pain.   Gastrointestinal:  Negative for nausea.   Genitourinary:  Negative for dysuria.   Musculoskeletal:  Positive for arthralgias. Negative for back pain.   Skin:  Negative for rash.   Neurological:  Negative for weakness.   Hematological:  Does not bruise/bleed easily.   Psychiatric/Behavioral:  Negative for behavioral problems.        Physical Exam     Initial Vitals [08/25/24 0917]   BP Pulse Resp Temp SpO2   108/75 105 18 99.8 °F (37.7 °C) 97 %      MAP       --         Physical Exam    Nursing note and vitals reviewed.  Constitutional: He  appears well-developed.   HENT:   Head: Normocephalic.   Eyes: Pupils are equal, round, and reactive to light.   Neck:   Normal range of motion.  Cardiovascular:  Normal rate and regular rhythm.           Pulmonary/Chest: Breath sounds normal.   Musculoskeletal:         General: Normal range of motion.      Cervical back: Normal range of motion.     Neurological: He is alert and oriented to person, place, and time.   Psychiatric: He has a normal mood and affect. Thought content normal.         Medical Screening Exam   See Full Note    ED Course   Procedures  Labs Reviewed   SARS-COV-2 RNA AMPLIFICATION, QUAL - Abnormal       Result Value    SARS COV-2 Molecular Positive (*)    INFLUENZA A & B BY MOLECULAR - Normal    INFLUENZA A MOLECULAR Negative      INFLUENZA B MOLECULAR  Negative            Imaging Results    None          Medications - No data to display  Medical Decision Making  Pt presents with flu-like symptoms since yesterday.       Amount and/or Complexity of Data Reviewed  Labs: ordered.     Details: Covid-positive, flu negative     Risk  Risk Details: Pt is discharged home in stable condition with diagnosis of covid.                                       Clinical Impression:   Final diagnoses:  [U07.1] COVID-19 (Primary)        ED Disposition Condition    Discharge Stable          ED Prescriptions    None       Follow-up Information    None          Ginny Saini, MICHELLE  08/25/24 1018

## 2025-05-30 ENCOUNTER — HOSPITAL ENCOUNTER (EMERGENCY)
Facility: HOSPITAL | Age: 40
Discharge: HOME | End: 2025-05-30
Attending: EMERGENCY MEDICINE
Payer: COMMERCIAL

## 2025-05-30 VITALS
RESPIRATION RATE: 16 BRPM | DIASTOLIC BLOOD PRESSURE: 91 MMHG | WEIGHT: 150 LBS | HEART RATE: 75 BPM | SYSTOLIC BLOOD PRESSURE: 139 MMHG | TEMPERATURE: 98 F | OXYGEN SATURATION: 98 % | HEIGHT: 68 IN | BODY MASS INDEX: 22.73 KG/M2

## 2025-05-30 DIAGNOSIS — K52.9 GASTROENTERITIS: Primary | ICD-10-CM

## 2025-05-30 PROCEDURE — 99283 EMERGENCY DEPT VISIT LOW MDM: CPT

## 2025-05-30 PROCEDURE — 25000003 PHARM REV CODE 250: Performed by: EMERGENCY MEDICINE

## 2025-05-30 RX ORDER — ONDANSETRON 4 MG/1
4 TABLET, ORALLY DISINTEGRATING ORAL
Status: COMPLETED | OUTPATIENT
Start: 2025-05-30 | End: 2025-05-30

## 2025-05-30 RX ORDER — ONDANSETRON 4 MG/1
4 TABLET, ORALLY DISINTEGRATING ORAL EVERY 6 HOURS PRN
Qty: 10 TABLET | Refills: 0 | Status: SHIPPED | OUTPATIENT
Start: 2025-05-30

## 2025-05-30 RX ADMIN — ONDANSETRON 4 MG: 4 TABLET, ORALLY DISINTEGRATING ORAL at 07:05

## (undated) DEVICE — GLOVE PROTEXIS PI CRM 5.5

## (undated) DEVICE — CATH DIAG IMPULSE 6FR FR4

## (undated) DEVICE — GLOVE PROTEXIS PI CRM 8

## (undated) DEVICE — TOWEL OR DISP STRL BLUE 4/PK

## (undated) DEVICE — PROTECTOR ULNAR NERVE FOAM

## (undated) DEVICE — CONTRAST ISOVUE 370 100ML

## (undated) DEVICE — WIRE CHOICE PT X SUPP 014X300

## (undated) DEVICE — SET IV PRIMARY

## (undated) DEVICE — INTRODUCER KIT MICRO 4FR

## (undated) DEVICE — SHEATH INTRODUCER 6FR 11CM

## (undated) DEVICE — SET EXTENSION CLEARLINK 2INJ

## (undated) DEVICE — CHLORAPREP 10.5 ML APPLICATOR

## (undated) DEVICE — DECANTER FLUID TRNSF WHITE 9IN

## (undated) DEVICE — Device

## (undated) DEVICE — GLOVE SURG ULTRA TOUCH 6

## (undated) DEVICE — GUIDE VISTA 6FR JL 4.0

## (undated) DEVICE — DRESSING TRANS 4X4 TEGADERM

## (undated) DEVICE — GOWN NONREINF SET-IN SLV 2XL

## (undated) DEVICE — DEVICE BLUE DIAMOND INFL 20ML